# Patient Record
Sex: FEMALE | Race: WHITE
[De-identification: names, ages, dates, MRNs, and addresses within clinical notes are randomized per-mention and may not be internally consistent; named-entity substitution may affect disease eponyms.]

---

## 2018-05-22 ENCOUNTER — HOSPITAL ENCOUNTER (OUTPATIENT)
Dept: HOSPITAL 47 - RADMAMWWP | Age: 68
Discharge: HOME | End: 2018-05-22
Payer: COMMERCIAL

## 2018-05-22 DIAGNOSIS — Z12.31: Primary | ICD-10-CM

## 2018-05-22 PROCEDURE — 77067 SCR MAMMO BI INCL CAD: CPT

## 2018-05-23 NOTE — MM
Reason for exam: screening  (asymptomatic).

Last mammogram was performed 1 year and 1 month ago.



History:

Patient is postmenopausal.

Took hormonal contraceptives for 5 years.



Physical Findings:

A clinical breast exam by your physician is recommended on an annual basis and 

results should be correlated with mammographic findings.



MG Screening Mammo w CAD

Bilateral CC and MLO view(s) were taken.

Prior study comparison: May 1, 2017, bilateral MG screening mammo w CAD.  May 4, 

2016, bilateral MG screening mammo w CAD.

There are scattered fibroglandular densities.  There is no discrete abnormality.  

No significant changes when compared with prior studies.





ASSESSMENT: Negative, BI-RAD 1



RECOMMENDATION:

Routine screening mammogram of both breasts in 1 year.

## 2018-08-13 ENCOUNTER — HOSPITAL ENCOUNTER (OUTPATIENT)
Dept: HOSPITAL 47 - RADECHMAIN | Age: 68
Discharge: HOME | End: 2018-08-13
Attending: FAMILY MEDICINE
Payer: COMMERCIAL

## 2018-08-13 DIAGNOSIS — R55: Primary | ICD-10-CM

## 2018-08-13 PROCEDURE — 93271 ECG/MONITORING AND ANALYSIS: CPT

## 2018-08-13 PROCEDURE — 93270 REMOTE 30 DAY ECG REV/REPORT: CPT

## 2019-06-19 ENCOUNTER — HOSPITAL ENCOUNTER (OUTPATIENT)
Dept: HOSPITAL 47 - RADMAMWWP | Age: 69
Discharge: HOME | End: 2019-06-19
Attending: FAMILY MEDICINE
Payer: COMMERCIAL

## 2019-06-19 DIAGNOSIS — Z12.31: Primary | ICD-10-CM

## 2019-06-19 PROCEDURE — 77067 SCR MAMMO BI INCL CAD: CPT

## 2019-06-24 NOTE — MM
Reason for exam: screening  (asymptomatic).

Last mammogram was performed 1 year and 1 month ago.



History:

Patient is postmenopausal.

Took hormonal contraceptives for 5 years.



Physical Findings:

A clinical breast exam by your physician is recommended on an annual basis and 

results should be correlated with mammographic findings.



MG Screening Mammo w CAD

Bilateral CC and MLO view(s) were taken.

Prior study comparison: May 22, 2018, bilateral MG screening mammo w CAD.  May 1, 

2017, bilateral MG screening mammo w CAD.

There are scattered fibroglandular densities.  Benign vascular calcifications.  No

significant changes when compared with prior studies.





ASSESSMENT: Negative, BI-RAD 1



RECOMMENDATION:

Routine screening mammogram of both breasts in 1 year.

## 2020-06-22 ENCOUNTER — HOSPITAL ENCOUNTER (OUTPATIENT)
Dept: HOSPITAL 47 - RADMAMWWP | Age: 70
Discharge: HOME | End: 2020-06-22
Attending: FAMILY MEDICINE
Payer: COMMERCIAL

## 2020-06-22 DIAGNOSIS — Z12.31: Primary | ICD-10-CM

## 2020-06-22 PROCEDURE — 77067 SCR MAMMO BI INCL CAD: CPT

## 2020-06-23 NOTE — MM
Reason for exam: screening  (asymptomatic).

Last mammogram was performed 1 year ago.



History:

Patient is postmenopausal.

Took hormonal contraceptives for 5 years.



Physical Findings:

A clinical breast exam by your physician is recommended on an annual basis and 

results should be correlated with mammographic findings.



MG Screening Mammo w CAD

Bilateral CC and MLO view(s) were taken.

Prior study comparison: June 19, 2019, bilateral MG screening mammo w CAD.  May 

22, 2018, bilateral MG screening mammo w CAD.

There are scattered fibroglandular densities.  There is no discrete abnormality.  

No significant changes when compared with prior studies.





ASSESSMENT: Negative, BI-RAD 1



RECOMMENDATION:

Routine screening mammogram of both breasts in 1 year.

## 2021-06-24 ENCOUNTER — HOSPITAL ENCOUNTER (OUTPATIENT)
Dept: HOSPITAL 47 - RADMAMWWP | Age: 71
Discharge: HOME | End: 2021-06-24
Attending: FAMILY MEDICINE
Payer: COMMERCIAL

## 2021-06-24 DIAGNOSIS — Z78.0: ICD-10-CM

## 2021-06-24 DIAGNOSIS — Z12.31: Primary | ICD-10-CM

## 2021-06-24 PROCEDURE — 77067 SCR MAMMO BI INCL CAD: CPT

## 2021-06-28 NOTE — MM
Reason for exam: screening  (asymptomatic).

Last mammogram was performed 1 year ago.



History:

Patient is postmenopausal.

Took hormonal contraceptives for 5 years.



Physical Findings:

A clinical breast exam by your physician is recommended on an annual basis and 

results should be correlated with mammographic findings.



MG Screening Mammo w CAD

Bilateral CC and MLO view(s) were taken.

Prior study comparison: June 22, 2020, bilateral MG screening mammo w CAD.  June 19, 2019, bilateral MG screening mammo w CAD.

There are scattered fibroglandular densities.  No significant changes when 

compared with prior studies.





ASSESSMENT: Benign, BI-RAD 2



RECOMMENDATION:

Routine screening mammogram of both breasts in 1 year.

## 2022-04-12 ENCOUNTER — HOSPITAL ENCOUNTER (OUTPATIENT)
Dept: HOSPITAL 47 - LABWHC1 | Age: 72
Discharge: HOME | End: 2022-04-12
Payer: COMMERCIAL

## 2022-04-12 DIAGNOSIS — J30.89: Primary | ICD-10-CM

## 2022-04-12 PROCEDURE — 36415 COLL VENOUS BLD VENIPUNCTURE: CPT

## 2022-04-12 PROCEDURE — 86001 ALLERGEN SPECIFIC IGG: CPT

## 2022-07-21 ENCOUNTER — HOSPITAL ENCOUNTER (OUTPATIENT)
Dept: HOSPITAL 47 - RADMAMWWP | Age: 72
Discharge: HOME | End: 2022-07-21
Attending: FAMILY MEDICINE
Payer: COMMERCIAL

## 2022-07-21 DIAGNOSIS — Z12.31: Primary | ICD-10-CM

## 2022-07-21 DIAGNOSIS — Z78.0: ICD-10-CM

## 2022-07-21 PROCEDURE — 77067 SCR MAMMO BI INCL CAD: CPT

## 2022-07-22 NOTE — MM
Reason for Exam: Screening  (asymptomatic). 

Last mammogram was performed 1 year(s) and 1 month(s) ago. 





Patient History: 

Menarche at age 12. First Full-Term Pregnancy at age 18. Postmenopausal. Patient used Hormonal

Contraceptives for 5 years. 





Risk Values: 

[place] 5 year model risk: 1.3%.

NCI Lifetime model risk: 3.3%.





Prior Study Comparison: 

6/19/2019 Bilateral Screening Mammogram, [facility]. 6/22/2020 Bilateral Screening Mammogram, [facility].

6/24/2021 Bilateral Screening Mammogram, [facility]. 





Tissue Density: 

There are scattered fibroglandular densities.





Findings: 

Analyzed By CAD. 

There is no suspicious group of microcalcifications or new suspicious mass in either breast. No

significant change from prior examination. 





Overall Assessment: Negative, BI-RAD 1





Management: 

Screening Mammogram of both breasts in 1 year.

A clinical breast exam by your physician is recommended on an annual basis and results should be

correlated with mammographic findings.



Electronically signed and approved by: Fred Jalloh D.O.

## 2022-10-18 ENCOUNTER — HOSPITAL ENCOUNTER (OUTPATIENT)
Dept: HOSPITAL 47 - RADCTMAIN | Age: 72
Discharge: HOME | End: 2022-10-18
Attending: ORTHOPAEDIC SURGERY
Payer: COMMERCIAL

## 2022-10-18 DIAGNOSIS — M17.12: Primary | ICD-10-CM

## 2022-10-18 DIAGNOSIS — M21.162: ICD-10-CM

## 2022-10-18 NOTE — CT
CT left knee [facility] protocol

 

HISTORY: Osteoarthritis, pain

 

Helical acquisition through the left lower extremity for surgical planning purposes urine automated e
xposure control for dose reduction,  mGycentimeters. Sagittal and coronal reconstructions.

 

No comparisons

 

Left hip joint shows some mild hypertrophic change of the acetabulum. Marked osteoarthritic changes p
resent within the knee, tricompartmental marginal spurring, loss of joint space, there is likely ther
e is deformity, loose body. Ankle joint is intact. There is a plantar calcaneal spur present. Some sp
urring present at the tibiotalar joint.

 

Diverticular change noted incidentally within the colon.

 

IMPRESSION: CT for procedure planning purposes.

## 2022-11-07 ENCOUNTER — HOSPITAL ENCOUNTER (OUTPATIENT)
Dept: HOSPITAL 47 - LABPAT | Age: 72
Discharge: HOME | End: 2022-11-07
Attending: ORTHOPAEDIC SURGERY
Payer: COMMERCIAL

## 2022-11-07 DIAGNOSIS — Z01.812: Primary | ICD-10-CM

## 2022-11-07 LAB
APTT BLD: 30 SEC (ref 22–30)
ERYTHROCYTE [DISTWIDTH] IN BLOOD BY AUTOMATED COUNT: 4.15 X 10*6/UL (ref 4.1–5.2)
ERYTHROCYTE [DISTWIDTH] IN BLOOD: 12.9 % (ref 11.5–14.5)
HCT VFR BLD AUTO: 40.5 % (ref 37.2–46.3)
HGB BLD-MCNC: 12.6 G/DL (ref 12–15)
INR PPP: 1.2 (ref ?–1.2)
MCH RBC QN AUTO: 30.4 PG (ref 27–32)
MCHC RBC AUTO-ENTMCNC: 31.1 G/DL (ref 32–37)
MCV RBC AUTO: 97.6 FL (ref 80–97)
NRBC BLD AUTO-RTO: 0 /100 WBCS (ref 0–0)
PLATELET # BLD AUTO: 169 X 10*3/UL (ref 140–440)
PT BLD: 12.3 SEC (ref 9–12)
WBC # BLD AUTO: 7.97 X 10*3/UL (ref 4.5–10)

## 2022-11-07 PROCEDURE — 81003 URINALYSIS AUTO W/O SCOPE: CPT

## 2022-11-07 PROCEDURE — 85610 PROTHROMBIN TIME: CPT

## 2022-11-07 PROCEDURE — 85730 THROMBOPLASTIN TIME PARTIAL: CPT

## 2022-11-07 PROCEDURE — 87070 CULTURE OTHR SPECIMN AEROBIC: CPT

## 2022-11-07 PROCEDURE — 80053 COMPREHEN METABOLIC PANEL: CPT

## 2022-11-07 PROCEDURE — 36415 COLL VENOUS BLD VENIPUNCTURE: CPT

## 2022-11-07 PROCEDURE — 85027 COMPLETE CBC AUTOMATED: CPT

## 2022-11-08 LAB
ALBUMIN SERPL-MCNC: 4.1 G/DL (ref 3.8–4.9)
ALBUMIN/GLOB SERPL: 1.2 G/DL (ref 1.6–3.17)
ALP SERPL-CCNC: 69 U/L (ref 41–126)
ALT SERPL-CCNC: 8 U/L (ref 8–44)
ANION GAP SERPL CALC-SCNC: 12 MMOL/L (ref 10–18)
AST SERPL-CCNC: 27 U/L (ref 13–35)
BUN SERPL-SCNC: 24.4 MG/DL (ref 9–27)
BUN/CREAT SERPL: 26.7 RATIO (ref 12–20)
CALCIUM SPEC-MCNC: 9.5 MG/DL (ref 8.7–10.3)
CHLORIDE SERPL-SCNC: 99 MMOL/L (ref 96–109)
CO2 SERPL-SCNC: 25.2 MMOL/L (ref 20–27.5)
GLOBULIN SER CALC-MCNC: 3.4 G/DL (ref 1.6–3.3)
GLUCOSE SERPL-MCNC: 90 MG/DL (ref 70–110)
PH UR: 5.5 [PH] (ref 5–8)
POTASSIUM SERPL-SCNC: 4.3 MMOL/L (ref 3.5–5.5)
PROT SERPL-MCNC: 7.4 G/DL (ref 6.2–8.2)
SODIUM SERPL-SCNC: 136 MMOL/L (ref 135–145)
SP GR UR: 1.02 (ref 1–1.03)
UROBILINOGEN UR QL: 0.2

## 2022-12-02 ENCOUNTER — HOSPITAL ENCOUNTER (OUTPATIENT)
Dept: HOSPITAL 47 - OR | Age: 72
LOS: 2 days | Discharge: HOME HEALTH | End: 2022-12-04
Attending: ORTHOPAEDIC SURGERY
Payer: COMMERCIAL

## 2022-12-02 DIAGNOSIS — I10: ICD-10-CM

## 2022-12-02 DIAGNOSIS — Z96.652: ICD-10-CM

## 2022-12-02 DIAGNOSIS — Z71.82: ICD-10-CM

## 2022-12-02 DIAGNOSIS — I48.91: ICD-10-CM

## 2022-12-02 DIAGNOSIS — M17.12: Primary | ICD-10-CM

## 2022-12-02 DIAGNOSIS — Z79.01: ICD-10-CM

## 2022-12-02 PROCEDURE — 97161 PT EVAL LOW COMPLEX 20 MIN: CPT

## 2022-12-02 PROCEDURE — 73560 X-RAY EXAM OF KNEE 1 OR 2: CPT

## 2022-12-02 PROCEDURE — 94760 N-INVAS EAR/PLS OXIMETRY 1: CPT

## 2022-12-02 PROCEDURE — 85025 COMPLETE CBC W/AUTO DIFF WBC: CPT

## 2022-12-02 PROCEDURE — 64447 NJX AA&/STRD FEMORAL NRV IMG: CPT

## 2022-12-02 PROCEDURE — 76942 ECHO GUIDE FOR BIOPSY: CPT

## 2022-12-02 PROCEDURE — 64999 UNLISTED PX NERVOUS SYSTEM: CPT

## 2022-12-02 PROCEDURE — 27447 TOTAL KNEE ARTHROPLASTY: CPT

## 2022-12-02 RX ADMIN — POTASSIUM CHLORIDE SCH MLS/HR: 14.9 INJECTION, SOLUTION INTRAVENOUS at 21:52

## 2022-12-02 RX ADMIN — DOCUSATE SODIUM AND SENNOSIDES SCH EACH: 50; 8.6 TABLET ORAL at 21:54

## 2022-12-02 RX ADMIN — POTASSIUM CHLORIDE SCH MLS: 14.9 INJECTION, SOLUTION INTRAVENOUS at 14:20

## 2022-12-02 RX ADMIN — HYDROCODONE BITARTRATE AND ACETAMINOPHEN PRN EACH: 5; 325 TABLET ORAL at 21:53

## 2022-12-02 NOTE — XR
EXAMINATION TYPE: XR knee limited LT

 

DATE OF EXAM: 12/2/2022

 

COMPARISON: NONE

 

HISTORY: Postop knee pain

 

TECHNIQUE: 2 view

 

FINDINGS: There is left knee prosthesis. Components are in anatomic position.

 

IMPRESSION: No complicating process seen

## 2022-12-02 NOTE — P.OP
Date of Procedure: 12/02/22


Preoperative Diagnosis: 


1.  Severe left knee osteoarthritis


2.  BMI 37


3.  Atrial fibrillation on Xarelto


Postoperative Diagnosis: 


Same


Procedure(s) Performed: 


Left total knee replacement


Implants: 


1. Niles Triathlon CR Femur Size #5


2. Shilpi Triathlon Universal Tibial Base Size #4 with [id number] stem


3. Shilpi Triathlon CS poly Size #4, 10-mm


4. Shilpi Triathlon all poly patella, Size #29


Anesthesia: SNEHA, regional


Surgeon: Cole Marc


Assistant #1: Chapincito Anderson


Estimated Blood Loss (ml): 50


IV fluids (ml): 1,200


Pathology: none sent (Gross and/or histopathologic evaluation by pathology is 

not indicated based on my interpretation of preoperative imaging and 

intraoperative findings.)


Condition: stable


Disposition: PACU


Indications for Procedure: 


I met with the patient preoperatively in the office setting and discussed 

treatment of their symptomatic knee arthritis.  They failed a long course of 

nonsurgical treatment and elected to proceed with an elective total knee 

replacement.  I discussed the potential risks and complications at length and 

gave them ample time to ask questions.  Risks discussed included: risks from 

anesthesia, superficial site surgical infection, acute and/or chronic 

periprosthetic joint infection, delayed wound healing, drainage, wound necrosis,

instability, stiffness, stiffness requiring manipulation and/or revision 

surgery, damage to local blood vessels or nerves, aseptic loosening of the 

implants, extensor mechanism issues including disruption, patellar maltracking, 

avascular necrosis etc., continued or worsened knee pain, generalized dissat

isfaction with surgical outcome, need for revision surgery, an inability to 

regain preinjury level of function, DVT, PE, other medical complications, and 

possibly loss of life or limb.  The patient voiced their understanding that 

while these are the most common complications other less common complications 

are possible.  They provided both their verbal and written consent to go forward

with surgery.


Operative Findings: 


Severe tricompartmental osteoarthritis


Description of Procedure: 


The patient was identified in preoperative holding and the correct operative 

extremity was verified and marked with a marker.  I reviewed the consent form 

with the patient at length.  All of their questions were answered.  The patient 

was given a block by anesthesia.  They were then brought back to the operating 

room.  They were transferred onto the operating room table where a general 

anesthetic, preoperative antibiotics, and tranexamic acid were administered by 

anesthesia.  A tourniquet was applied to the proximal aspect of the operative 

extremity.  The contralateral extremity was padded under the heel and secured to

the operating room table with a nonsterile blue towel and tape.  The ipsilateral

arm was carefully draped across the patient's chest and secured with a pillow 

and foam.  A post was applied over the lateral aspect of the ipsilateral thigh 

and a bolster was placed under the ipsilateral foot.  I verified that the 

operative extremity was stable and the knee was flexed to 90.  The operative 

extremity was then placed in a leg santos, nonsterile drapes were applied, and 

the extremity was prepped and draped sterilely in the standard sterile fashion. 

Prior to starting surgery timeout was performed identifying the correct patient,

operative extremity, and procedure.  The leg was then elevated, exsanguinated 

with an Esmarch bandage, and the tourniquet was inflated.





An anterior midline incision was made sharply with a scalpel.  Once I had 

dissected deep to the superficial fascial layer medial and lateral flaps were 

elevated.  A medial parapatellar arthrotomy was created.  Upon opening the knee 

joint there were diffuse arthritic changes in all 3 compartments.  The anterior 

horn of the medial meniscus were sharply released and a medial release was 

performed around the posterior medial corner of the knee to facilitate retractor

placement.  The fat pad was excised with electrocautery.  The patella was found 

to be severely arthritic and a provisional cut was made with a sagittal saw to 

facilitate mobilization of the extensor mechanism during the procedure.  

Remnants of the ACL and PCL were then excised from the notch.  4 mm pins were 

then placed within the incision in the medial distal femur and proximal tibia.  

Arrays were applied to the pins and I verified they were completely tightened.  

The knee was then registered with the "[employer] robot and manipulations in implant 

position were made to balance the knee and opitmize implant position.  Using the

"[employer] robotic saw all cuts were made in accordance with our plan.  After all bony

fragments had been removed the cuts were verified with the planar probe.  The 

tibia was then subluxed forward and sized.  The knee was brought into flexion 

and a lamina  was placed to allow removal of the meniscal remnants both 

medially and laterally as well as posterior osteophytes.  Local anesthetic was 

then infiltrated around the joint capsule.  Trial implants were then placed 

within the knee.  Range of motion and collateral ligament tension was then 

evaluated.  Adjustments in implant size and position were then made accordingly.

 Once the knee was felt to be appropriately balanced the Adam pins were removed.

The patella was then recut, sized, and punched.  A trial patellar button was 

then placed.  With the trial components in place, the patella tracked midline.  

The femur was then drilled and the trial component removed.  The trial tibial 

component was then appropriately rotated, pinned, and prepared for the keel.  

All trial components were then removed from the knee.  The knee was thoroughly 

irrigated with pulsatile lavage.  Cement was prepared via vacuum mixing in a 

bowl on the back table.  I then hand pressurized cement into the femur and tibia

and placed the implants beginning with the tibial base tray and poly liner, 

femoral component, and finally the patellar button.  All extruded cement was 

removed including from the pin sites.  Once the cement had hardened the knee was

evaluated one final time with the final polyethylene liner in place.  The knee 

had full extension and flexion and felt stable to varus and valgus stress 

throughout the arc of motion.  The tourniquet was released and with the 

tourniquet down the patella tracked midline.  All bleeders were controlled with 

electrocautery.  The knee was then soaked for 3 minutes with a dilute Betadine 

soak.  The knee was thoroughly irrigated using 3 L of sterile saline and 

pulsatile lavage.  A deep drain was placed.  The extensor mechanism was then 

reapproximated using pop off Vicryl sutures followed by a running barbed suture.

 The knee was then closed in layers with a 0 strata fix for the deep fascial 

layer, 2-0 strata fix for the superficial subcutaneous layer and Monocryl and 

Steri-Strips for the skin.  A sterile dressing and drain sponge were applied.  I

verified that all instrument, sponge, and sharp counts were correct.  The 

patient was then transferred off the operating room table, extubated, and 

brought to recovery having tolerated the procedure well.





Chapincito Anderson PA-C was required as a skilled assistant due to the complexity of 

the procedure for patient positioning, draping, retraction, placement of 

hardware, and closure of wound.





PLAN: The patient can weight-bear as tolerated on the operative extremity.  DVT 

prophylaxis with aspirin 81 mg twice a day based on preoperative risk 

stratification.  Follow-up in the office in 2 weeks for wound check and x-rays 

of the knee including an AP and lateral.

## 2022-12-02 NOTE — P.ANPRN
Procedure Note - Anesthesia





- Nerve Block Performed


  ** Left Adductor Canal Single


Time Out Performed: Yes


Date of Procedure: 12/02/22


Procedure Start Time: 14:48


Procedure Stop Time: 14:56


Location of Patient: PreOp


Indication: Acute Post-Operative Pain, Requested by Surgeon


Sedation Type: Sedate with meaningful contact maintained


Preparation: Sterile Prep, Sterile Dressing


Position: Supine


Catheter: None


Needle Types: Facet


Needle Gauge: 20


Ultrasound used to visualize needle placement: Yes


Ultrasound used to observe medication spread: Yes


Injectate: 0.5% Ropivacaine (see comment for volume) (15 ml + 2 mg decadron)


Blood Aspirated: No


Pain Paresthesia on Injection Noted: No


Resistance on Injection: Normal


Image Stored and Saved: Yes


Events: Uneventful and Well Tolerated





  ** Left iPack Single


Time Out Performed: Yes


Date of Procedure: 12/02/22


Procedure Start Time: 14:57


Procedure Stop Time: 15:04


Location of Patient: PreOp


Indication: Acute Post-Operative Pain, Requested by Surgeon


Sedation Type: Sedate with meaningful contact maintained


Preparation: Sterile Prep, Sterile Dressing


Position: Right Lateral


Catheter: None


Needle Types: Facet


Needle Gauge: 20


Ultrasound used to visualize needle placement: Yes


Ultrasound used to observe medication spread: Yes


Injectate: 0.5% Ropivacaine (see comment for volume) (15 ml + decadron 2 mg)


Blood Aspirated: No


Pain Paresthesia on Injection Noted: No


Resistance on Injection: Normal


Image Stored and Saved: Yes


Events: Uneventful and Well Tolerated

## 2022-12-03 LAB
BASOPHILS # BLD AUTO: 0.01 X 10*3/UL (ref 0–0.1)
BASOPHILS NFR BLD AUTO: 0.1 %
EOSINOPHIL # BLD AUTO: 0 X 10*3/UL (ref 0.04–0.35)
EOSINOPHIL NFR BLD AUTO: 0 %
ERYTHROCYTE [DISTWIDTH] IN BLOOD BY AUTOMATED COUNT: 3.78 X 10*6/UL (ref 4.1–5.2)
ERYTHROCYTE [DISTWIDTH] IN BLOOD: 13.2 % (ref 11.5–14.5)
GLUCOSE BLD-MCNC: 126 MG/DL (ref 70–110)
HCT VFR BLD AUTO: 36.7 % (ref 37.2–46.3)
HGB BLD-MCNC: 11.7 G/DL (ref 12–15)
IMM GRANULOCYTES BLD QL AUTO: 0.5 %
LYMPHOCYTES # SPEC AUTO: 1.22 X 10*3/UL (ref 0.9–5)
LYMPHOCYTES NFR SPEC AUTO: 11.1 %
MCH RBC QN AUTO: 31 PG (ref 27–32)
MCHC RBC AUTO-ENTMCNC: 31.9 G/DL (ref 32–37)
MCV RBC AUTO: 97.1 FL (ref 80–97)
MONOCYTES # BLD AUTO: 0.73 X 10*3/UL (ref 0.2–1)
MONOCYTES NFR BLD AUTO: 6.6 %
NEUTROPHILS # BLD AUTO: 8.99 X 10*3/UL (ref 1.8–7.7)
NEUTROPHILS NFR BLD AUTO: 81.7 %
NRBC BLD AUTO-RTO: 0 /100 WBCS (ref 0–0)
PLATELET # BLD AUTO: 151 X 10*3/UL (ref 140–440)
WBC # BLD AUTO: 11 X 10*3/UL (ref 4.5–10)

## 2022-12-03 RX ADMIN — IPRATROPIUM BROMIDE SCH: 42 SPRAY NASAL at 20:57

## 2022-12-03 RX ADMIN — METOPROLOL SUCCINATE SCH MG: 25 TABLET, EXTENDED RELEASE ORAL at 08:38

## 2022-12-03 RX ADMIN — POTASSIUM CHLORIDE SCH: 14.9 INJECTION, SOLUTION INTRAVENOUS at 16:04

## 2022-12-03 RX ADMIN — OXYCODONE HYDROCHLORIDE AND ACETAMINOPHEN SCH MG: 500 TABLET ORAL at 08:39

## 2022-12-03 RX ADMIN — HYDROCODONE BITARTRATE AND ACETAMINOPHEN PRN EACH: 5; 325 TABLET ORAL at 22:05

## 2022-12-03 RX ADMIN — IPRATROPIUM BROMIDE SCH: 42 SPRAY NASAL at 08:40

## 2022-12-03 RX ADMIN — DOCUSATE SODIUM AND SENNOSIDES SCH EACH: 50; 8.6 TABLET ORAL at 20:55

## 2022-12-03 RX ADMIN — SERTRALINE HYDROCHLORIDE SCH MG: 50 TABLET, FILM COATED ORAL at 08:38

## 2022-12-03 RX ADMIN — POTASSIUM CHLORIDE SCH: 14.9 INJECTION, SOLUTION INTRAVENOUS at 05:31

## 2022-12-03 RX ADMIN — Medication SCH MCG: at 08:39

## 2022-12-03 RX ADMIN — HYDROCODONE BITARTRATE AND ACETAMINOPHEN PRN EACH: 5; 325 TABLET ORAL at 16:01

## 2022-12-03 RX ADMIN — POTASSIUM CHLORIDE SCH MLS/HR: 14.9 INJECTION, SOLUTION INTRAVENOUS at 05:30

## 2022-12-03 RX ADMIN — HYDROCODONE BITARTRATE AND ACETAMINOPHEN PRN EACH: 5; 325 TABLET ORAL at 05:30

## 2022-12-03 NOTE — CONS
CONSULTATION



REASON FOR CONSULTATION:

Advice regarding atrial fibrillation and other medical issues, requested by [facility]

[facility].



HISTORY OF PRESENT ILLNESS:

This 72-year-old woman with a past medical history of atrial fibrillation,

hypertension, and other medical issues, underwent left total knee arthroplasty.  There

is no history of any fever or rigors.  No history of any headache, loss of

consciousness, chest pain, or palpitations at this time.



PAST MEDICAL HISTORY:

Atrial fibrillation, hypertension.  The rest of the history and rest of the chart is

reviewed.



HOME MEDICATIONS:

Reviewed and include Zestril.  Dose and rest of medications reviewed.



ALLERGIES:

None.



FAMILY HISTORY:

No history of heart disease or strokes in the family.



SOCIAL HISTORY:

No history of smoking or alcohol.



REVIEW OF SYSTEMS:

A 14-point review of systems is negative as mentioned earlier.



PHYSICAL EXAMINATION:

VITAL SIGNS:  Pulse is 61, blood pressure 110/61, respirations 17.

HEENT:  Conjunctivae normal.

NECK:  No JVD.

CARDIOVASCULAR:  S1, S2.

RESPIRATION:  Breath sounds diminished at the bases.

ABDOMEN:  Soft.

LEGS:  Status post left knee arthroplasty.

SKIN:  No ulcer, rash, or bleeding.

JOINTS:  No active deforming arthropathy.



LABORATORY DATA:

WBC 11.  The rest of the labs are noted.



ASSESSMENT:

1. Status post left total knee arthroplasty.

2. History of atrial fibrillation.

3. Hypertension.

4. Degenerative joint disease.

5. Multiple medical issues.



RECOMMENDATIONS:

I recommend to continue current medications, continue symptomatic treatment.

Otherwise, resume the home medications including Xarelto when okay with Orthopedic

[facility].  Otherwise, DVT prophylaxis.  Further recommendations to follow.





MMODL / IJN: [id number] / Job#: [id number]

## 2022-12-03 NOTE — P.DS
Providers


Date of admission: 





Friday, 12/02/2022


Attending physician: 


Cole Marc





Consults: 





                                        





12/02/22 19:27


Consult Physician Routine 


   Consulting Provider: Simran Grande


   Consult Reason/Comments: medical management


   Do you want consulting provider notified?: Yes











Primary care physician: 


Eleno Mau





[facility] Course: 





The patient is very pleasant previously healthy 72-year-old that has a 

long-standing history of problems with her left knee.  She underwent an 

uncomplicated left total knee replacement.  She was admitted under my care.  

Medical issues were managed by internal medicine.  Her drain was pulled on 

postoperative day #1.  She worked with physical therapy.  She ultimately did 

well and was cleared for discharge home.





Plan - Discharge Summary


Discharge Rx Participant: Yes


New Discharge Prescriptions: 


New


   Docusate [Colace] 100 mg PO BID #60 capsule


   HYDROcodone/APAP 5-325MG [Norco 5-325] 1 - 2 tab PO Q6HR PRN 7 Days #32 tab


     PRN Reason: Pain


   Omeprazole 40 mg PO DAILY 30 Days #30 cap





No Action


   lisinopriL [Zestril] 20 mg PO DAILY


   Ipratropium Bromide 0.06%Nasal [Atrovent Nasal 0.06%] 2 spray EA NOSTRIL BID


   Rivaroxaban [Xarelto] 20 mg PO HS


   hydroCHLOROthiazide [Hydrodiuril] 12.5 mg PO DAILY


   Sertraline [Zoloft] 50 mg PO DAILY


   Metoprolol Succinate (ER) [Toprol Xl] 25 mg PO DAILY


   Cholecalciferol [Vitamin D3 (25 Mcg = 1000 Iu)] 25 mcg PO DAILY


   Ascorbic Acid [Vitamin C] 500 mg PO DAILY


Discharge Medication List





lisinopriL [Zestril] 20 mg PO DAILY 11/29/22 [History]


Ascorbic Acid [Vitamin C] 500 mg PO DAILY 11/30/22 [History]


Cholecalciferol [Vitamin D3 (25 Mcg = 1000 Iu)] 25 mcg PO DAILY 11/30/22 

[History]


Ipratropium Bromide 0.06%Nasal [Atrovent Nasal 0.06%] 2 spray EA NOSTRIL BID 

11/30/22 [History]


Metoprolol Succinate (ER) [Toprol Xl] 25 mg PO DAILY 11/30/22 [History]


Rivaroxaban [Xarelto] 20 mg PO HS 11/30/22 [History]


Sertraline [Zoloft] 50 mg PO DAILY 11/30/22 [History]


hydroCHLOROthiazide [Hydrodiuril] 12.5 mg PO DAILY 11/30/22 [History]


Docusate [Colace] 100 mg PO BID #60 capsule 12/02/22 [Rx]


HYDROcodone/APAP 5-325MG [Norco 5-325] 1 - 2 tab PO Q6HR PRN 7 Days #32 tab 

12/02/22 [Rx]


Omeprazole 40 mg PO DAILY 30 Days #30 cap 12/02/22 [Rx]








Follow up Appointment(s)/Referral(s): 


[facility], [NON-STAFF] - 1-2 Days


Eleno León DO [Primary Care Provider] - 1 Week (Office is closed please 

call for appointment)


Cole Marc MD [Medical Doctor] - 2 Weeks (Office is closed please call 

for an appointment)


Activity/Diet/Wound Care/Special Instructions: 


Weight bear to tolerance on operative extremity with a walker. 


Keep operative dressing intact until follow-up appointment in the office. Call 

the office if dressing becomes saturated or falls off. May shower over dressing.




Take pain medications as prescribed. Resume Xarelto for blood clot prevention. 

Take antibiotics as prescribed.


Follow-up in the office in two weeks at [facility]. 


Call the office with any questions or concerns, [phone]


Discharge Disposition: HOME WITH HOME HEALTH SERVICES

## 2022-12-03 NOTE — P.PN
Subjective


Progress Note Date: 12/03/22





Overall the patient is doing well.  She has significant improvement in her pain 

from preop, but we discussed this may be due to her block.  She is artery been 

up with physical therapy.  She denies chest pain or shortness of breath.





Objective





- Vital Signs


Vital signs: 


                                   Vital Signs











Temp  97.4 F L  12/03/22 08:04


 


Pulse  61   12/03/22 08:31


 


Resp  17   12/03/22 08:31


 


BP  110/61   12/03/22 08:04


 


Pulse Ox  98   12/03/22 08:04


 


FiO2      








                                 Intake & Output











 12/02/22 12/03/22 12/03/22





 18:59 06:59 18:59


 


Intake Total 1150 200 


 


Output Total 50  90


 


Balance 1100 200 -90


 


Weight 99.4 kg 99.4 kg 


 


Intake:   


 


  IV 1150 200 


 


Output:   


 


  Drainage   90


 


    Left Knee   90


 


  Estimated Blood Loss 50  


 


Other:   


 


  Voiding Method  Toilet Toilet


 


  # Voids  2 














- Exam





The patient is sitting up in a chair at bedside.  She is alert and able to 

answer questions.  A focused exam of the left lower extremity was conducted.  On

inspection she has a clean appearing dressing with no strikethrough.  Her drain 

is in place and was removed.  A new sponge was applied.  Her thigh and calf are 

soft.  Motor and sensory function are intact.





- Labs


Labs: 


                  Abnormal Lab Results - Last 24 Hours (Table)











  12/03/22 Range/Units





  06:09 


 


POC Glucose (mg/dL)  126 H  ()  mg/dL














Assessment and Plan


Assessment: 





Postoperative day #1 status post left total knee replacement, doing well


Plan: 





1.  Weightbearing as tolerated left lower extremity, up with assistance, 

mobilize out of bed to chair


2.  Drain pulled this morning, leave surgical dressing in place


3.  Physical therapy for mobilization and gait training


4.  Resume Xarelto today for both DVT prophylaxis and treatment for atrial 

fibrillation


5.  Appreciate internal medicine assistance with perioperative medical 

management


6.  Disposition: We will see how the patient does today with physical therapy 

and her pain control.  We'll plan on keeping her overnight and discharging her 

home tomorrow morning.  If she does well and would like to discharge home today 

she is 0K to leave

## 2022-12-04 VITALS
DIASTOLIC BLOOD PRESSURE: 59 MMHG | TEMPERATURE: 97.8 F | HEART RATE: 60 BPM | SYSTOLIC BLOOD PRESSURE: 93 MMHG | RESPIRATION RATE: 17 BRPM

## 2022-12-04 RX ADMIN — SERTRALINE HYDROCHLORIDE SCH MG: 50 TABLET, FILM COATED ORAL at 07:46

## 2022-12-04 RX ADMIN — IPRATROPIUM BROMIDE SCH: 42 SPRAY NASAL at 07:47

## 2022-12-04 RX ADMIN — OXYCODONE HYDROCHLORIDE AND ACETAMINOPHEN SCH MG: 500 TABLET ORAL at 07:47

## 2022-12-04 RX ADMIN — Medication SCH MCG: at 07:47

## 2022-12-04 RX ADMIN — METOPROLOL SUCCINATE SCH MG: 25 TABLET, EXTENDED RELEASE ORAL at 07:47

## 2022-12-04 RX ADMIN — HYDROCODONE BITARTRATE AND ACETAMINOPHEN PRN EACH: 5; 325 TABLET ORAL at 03:12

## 2022-12-04 RX ADMIN — HYDROCODONE BITARTRATE AND ACETAMINOPHEN PRN EACH: 5; 325 TABLET ORAL at 09:03

## 2022-12-04 RX ADMIN — POTASSIUM CHLORIDE SCH MLS/HR: 14.9 INJECTION, SOLUTION INTRAVENOUS at 04:52

## 2022-12-04 RX ADMIN — POTASSIUM CHLORIDE SCH: 14.9 INJECTION, SOLUTION INTRAVENOUS at 05:40

## 2022-12-04 NOTE — P.PN
Subjective


Progress Note Date: 12/04/22 12/4/2022





This is a 72 year old female who was admitted under orthopedic services and is 

status post Left knee arthroplasty. Patient is up and working with physical 

therapy and pain is being managed on current regimen. Patient is on dvt 

prophylaxis in the form of xarelto and will continue. Patient reports she is 

being discharged home today.  Patient is afebrile and denies chest pain or 

shortness of breath. Encouraged increased activity as tolerated. Encouraged 

incentive spirometer use at least 10 times per hour while awake. Home 

medications reviewed and resumed. Recommend monitoring blood pressure and hold 

meds if systolic is 90 or less. 





Review of systems:


Constitutional: No reports of fatigue, fever, or chills


Cardiovascular: No reports of chest pain or palpitations


Respiratory: No reports of shortness of breath or cough


GI:  no reports of  nausea,  no vomiting, reports passing gas


: No reports of dysuria or retention


Neurovascular: no reports of generalized weakness 





All medications have been reviewed











PHYSICAL EXAMINATION: 





GENERAL: The patient is alert and oriented x4,  Well developed, well nourished. 

obese


HEENT: Pupils are round and equally reacting to light. EOMI. no scleral icterus.

No conjunctival pallor. Normocephalic, atraumatic. No pharyngeal erythema. No 

thyromegaly.  


CARDIOVASCULAR: S1 and S2  muffled 


PULMONARY: diminished breath sounds bilaterally with no wheezing or rhonchi 

noted. 


ABDOMEN: soft.  non- tender.  non-distended, normoactive bowel sounds. No 

palpable organomegaly. 


MUSCULOSKELETAL: No joint swelling or deformity.


EXTREMITIES: No cyanosis, clubbing, or pedal edema. left knee surgical site is 

dry and intact 


NEUROLOGICAL: Gross neurological examination did not reveal any focal deficits. 


SKIN: No rashes. 





Assessment:





Status post left total knee arthroplasty


HIstory of atrial fibrillation


hypertension


Degenerative joint disease


History of anxiety and depression


GI prophylaxis


DVT prophylaxis


Full code





Plan:





Recommend to continue with current medications and management per orthopedic 

services. Patient is post op left knee arthroplasty. Patient has been up and 

working with physical therapy and is planning for discharge today. Patient with 

incentive spirometer at the bedside and encouraged to continue using at least 10

times per hour while awake. Patient home medications resumed and blood pressures

are in the 90's systolic and recommend to monitor blood pressure closely and 

hold meds if systolic is 90 or less. Follow up with pcp on discharge. We will 

continue to follow with ortho while hospitalized. Thank you for this co

nsultation. .  





The impression and plan of care has been dictated as a scribe by Margaret Groves,

nurse practitioner as directed.





Dr. Harjinder MD


I have performed a history and examination and MDM of this patient, discussed 

the same with the dictator, and will be documented as a scribe. Based on total 

visit time,  I have performed more than 50% of the visit. Any additional 

findings or plans will be noted. 








Objective





- Vital Signs


Vital signs: 


                                   Vital Signs











Temp  97.8 F   12/04/22 08:00


 


Pulse  60   12/04/22 08:00


 


Resp  17   12/04/22 08:00


 


BP  93/59   12/04/22 08:00


 


Pulse Ox  100   12/04/22 08:00


 


FiO2      








                                 Intake & Output











 12/03/22 12/04/22 12/04/22





 18:59 06:59 18:59


 


Intake Total 236  


 


Output Total 90  


 


Balance 146  


 


Intake:   


 


  Oral 236  


 


Output:   


 


  Drainage 90  


 


    Left Knee 90  


 


Other:   


 


  Voiding Method Toilet Toilet Toilet


 


  # Voids 3 3 














- Labs


CBC & Chem 7: 


                                 12/03/22 06:46





Labs: 


                  Abnormal Lab Results - Last 24 Hours (Table)











  12/03/22 Range/Units





  06:46 


 


WBC  11.00 H  (4.50-10.00)  X 10*3/uL


 


RBC  3.78 L  (4.10-5.20)  X 10*6/uL


 


Hgb  11.7 L  (12.0-15.0)  g/dL


 


Hct  36.7 L  (37.2-46.3)  %


 


MCV  97.1 H  (80.0-97.0)  fL


 


MCHC  31.9 L  (32.0-37.0)  g/dL


 


Immature Gran #  0.05 H  (0.00-0.04)  X 10*3/uL


 


Neutrophils #  8.99 H  (1.80-7.70)  X 10*3/uL


 


Eosinophils #  0 L  (0.04-0.35)  X 10*3/uL

## 2022-12-04 NOTE — P.PN
Progress Note - Text


Progress Note Date: 12/04/22


Today is post-operative day #2. She is status-post left total knee arthroplasty 

on 12/2/22. The patient is examined bedside with Dr. Marc. 


Patient's pain is well-controlled. She is ambulating with a walker with minimal 

assistance. She is comfortable discharging home today. 





On examination, patient is sitting up in bed in no apparent distress. She is 

alert and answer questions appropriately. On inspection of the left knee, 

dressing is clean, dry, intact. Motor and sensory function intact. LLE warm and 

well perfused. Calf-nontender. 





Patient is going to discharge home today, pending medical clearance. She will 

follow-up at [facility] in two weeks.

## 2023-08-31 ENCOUNTER — HOSPITAL ENCOUNTER (OUTPATIENT)
Dept: HOSPITAL 47 - RADMAMWWP | Age: 73
Discharge: HOME | End: 2023-08-31
Attending: FAMILY MEDICINE
Payer: MEDICARE

## 2023-08-31 DIAGNOSIS — Z12.31: Primary | ICD-10-CM

## 2023-08-31 DIAGNOSIS — Z78.0: ICD-10-CM

## 2023-08-31 PROCEDURE — 77067 SCR MAMMO BI INCL CAD: CPT

## 2023-09-01 NOTE — MM
Reason for Exam: Screening  (asymptomatic). 

Last mammogram was performed 1 year(s) and 1 month(s) ago. 





Patient History: 

Menarche at age 12. First Full-Term Pregnancy at age 18. Postmenopausal. Patient used Hormonal

Contraceptives for 5 years. 





Risk Values: 

Verona 5 year model risk: 1.3%.

NCI Lifetime model risk: 3.1%.





Prior Study Comparison: 

6/22/2020 Bilateral Screening Mammogram, [facility]. 6/24/2021 Bilateral Screening Mammogram, [facility].

7/21/2022 Bilateral MG screening mammo w CAD, [facility]. 





Tissue Density: 

There are scattered fibroglandular densities.





Findings: 

Analyzed By CAD. 

There is no suspicious group of microcalcifications or new suspicious mass in either breast.

Bilateral vascular calcifications. 





Overall Assessment: Benign, BI-RAD 2





Management: 

Screening Mammogram of both breasts in 1 year.

A clinical breast exam by your physician is recommended on an annual basis and results should be

correlated with mammographic findings.



Note on Verona scores and lifetime risk:

1. A Verona score greater than 3% is considered moderate risk. If this is the case, consider

specialist referral to assess eligibility for a risk reducing agent.

If overall lifetime risk for the development of breast cancer is 20% or higher, the patient may

qualify for future screening with alternating mammogram and breast MRI.



Electronically signed and approved by: Fred Jalloh D.O.

## 2024-08-16 ENCOUNTER — HOSPITAL ENCOUNTER (OUTPATIENT)
Dept: HOSPITAL 47 - ORWHC2ENDO | Age: 74
End: 2024-08-16
Attending: INTERNAL MEDICINE
Payer: MEDICARE

## 2024-08-16 DIAGNOSIS — K57.30: ICD-10-CM

## 2024-08-16 DIAGNOSIS — I48.91: ICD-10-CM

## 2024-08-16 DIAGNOSIS — Z79.899: ICD-10-CM

## 2024-08-16 DIAGNOSIS — Z12.11: Primary | ICD-10-CM

## 2024-08-16 DIAGNOSIS — I10: ICD-10-CM

## 2024-08-16 PROCEDURE — 45378 DIAGNOSTIC COLONOSCOPY: CPT

## 2024-08-23 NOTE — OP
OPERATIVE REPORT



DATE OF SERVICE : 08/16/2024



BRIEF HISTORY:

The patient is a 74-year-old pleasant white female scheduled for an elective

colonoscopy as a part of screening for colorectal neoplasia.



PROCEDURE PERFORMED:

Colonoscopy.



PREOPERATIVE DIAGNOSIS:

Screening for colon cancer.



ANESTHESIA:

IV sedation per Anesthesia.



DESCRIPTION OF PROCEDURE:

After informed consent was obtained from the patient, she was brought into the

endoscopy unit.  IV conscious sedation was administered by Anesthesia on continuous

monitoring.  Initial digital rectal examination was normal.  The Olympus CF-190 video

colonoscope was then inserted into the rectum and gradually advanced into the cecum.

Careful examination was performed as the scope was gradually being withdrawn.  The

ileocecal valve in the appendiceal orifice was visualized and appeared normal.  The

prep was excellent.  Mucosa of the cecum, ascending colon, transverse colon, descending

colon, sigmoid colon, and rectum appeared normal.  There were moderate sigmoid

diverticulosis seen.  In the rectum, retroflexion was performed, no lesions noted.  The

patient tolerated the procedure well.



IMPRESSION:

1. Moderate sigmoid diverticulosis.

2. No evidence of colorectal neoplasia.



RECOMMENDATIONS:

Findings of this examination were discussed with the patient as well as the family, she

was advised to be a high-fiber diet, take fiber supplements on a regular basis, and

recommend repeat colonoscopy in 10 years.





MMODL / IJN: [id number] / Job#: [id number]

## 2024-09-03 ENCOUNTER — HOSPITAL ENCOUNTER (OUTPATIENT)
Dept: HOSPITAL 47 - RADMAMWWP | Age: 74
Discharge: HOME | End: 2024-09-03
Attending: FAMILY MEDICINE
Payer: MEDICARE

## 2024-09-03 PROCEDURE — 77067 SCR MAMMO BI INCL CAD: CPT

## 2024-09-03 PROCEDURE — 77063 BREAST TOMOSYNTHESIS BI: CPT

## 2024-09-08 NOTE — MM
Reason for Exam: Screening  (asymptomatic). 

Last mammogram was performed 1 year(s) and 1 month(s) ago. 





Patient History: 

Menarche at age 12. First Full-Term Pregnancy at age 18. Postmenopausal. Patient used Hormonal

Contraceptives for 5 years. 





Risk Values: 

Verona 5 year model risk: 1.3%.

NCI Lifetime model risk: 3.0%.





Prior Study Comparison: 

6/24/2021 Bilateral Screening Mammogram, [facility]. 7/21/2022 Bilateral MG screening mammo w CAD, [facility].

8/31/2023 Bilateral MG screening mammo w CAD, [facility]. 





Tissue Density: 

The breasts are almost entirely fatty.





Findings: 

Analyzed By CAD. 

Right breast: There is no suspicious group of microcalcifications or new suspicious mass.



Left breast: There is no suspicious group of microcalcifications or new suspicious mass. 





Overall Assessment: Negative, BI-RAD 1





Management: 

Screening Mammogram of both breasts in 1 year.

[facility] will attempt to contact patient to return for supplemental views and

ultrasound if indicated.



Patient should continue monthly self-breast exams.  A clinical breast exam by your physician is

recommended on an annual basis.

This exam should not preclude additional follow-up of suspicious palpable abnormalities.



Note on Verona scores and lifetime risk:

1. A Verona score greater than 3% is considered moderate risk. If this is the case, consider

specialist referral to assess eligibility for a risk reducing agent.

2. If overall lifetime risk for the development of breast cancer is 20% or higher, the patient may

qualify for future screening with alternating mammogram and breast MRI.



Electronically signed and approved by: Santhosh Velazquez DO

## 2024-09-22 ENCOUNTER — HOSPITAL ENCOUNTER (INPATIENT)
Dept: HOSPITAL 47 - EC | Age: 74
LOS: 5 days | Discharge: HOME | DRG: 417 | End: 2024-09-27
Attending: HOSPITALIST | Admitting: HOSPITALIST
Payer: MEDICARE
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
## 2024-09-22 DIAGNOSIS — L98.8: ICD-10-CM

## 2024-09-22 DIAGNOSIS — B96.1: ICD-10-CM

## 2024-09-22 DIAGNOSIS — I27.20: ICD-10-CM

## 2024-09-22 DIAGNOSIS — T38.0X5A: ICD-10-CM

## 2024-09-22 DIAGNOSIS — Z16.11: ICD-10-CM

## 2024-09-22 DIAGNOSIS — K82.8: ICD-10-CM

## 2024-09-22 DIAGNOSIS — D72.828: ICD-10-CM

## 2024-09-22 DIAGNOSIS — R79.89: ICD-10-CM

## 2024-09-22 DIAGNOSIS — U07.1: ICD-10-CM

## 2024-09-22 DIAGNOSIS — Z79.02: ICD-10-CM

## 2024-09-22 DIAGNOSIS — B96.20: ICD-10-CM
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
## 2024-09-22 DIAGNOSIS — Z79.01: ICD-10-CM

## 2024-09-22 DIAGNOSIS — M19.90: ICD-10-CM

## 2024-09-22 DIAGNOSIS — E66.09: ICD-10-CM

## 2024-09-22 DIAGNOSIS — I11.9: ICD-10-CM

## 2024-09-22 DIAGNOSIS — I48.19: ICD-10-CM

## 2024-09-22 DIAGNOSIS — E83.42: ICD-10-CM

## 2024-09-22 DIAGNOSIS — L02.211: ICD-10-CM

## 2024-09-22 DIAGNOSIS — K80.13: Primary | ICD-10-CM

## 2024-09-22 DIAGNOSIS — I08.1: ICD-10-CM

## 2024-09-22 DIAGNOSIS — Z79.899: ICD-10-CM

## 2024-09-22 DIAGNOSIS — K82.1: ICD-10-CM

## 2024-09-22 DIAGNOSIS — G47.33: ICD-10-CM

## 2024-09-22 LAB
ALBUMIN SERPL-MCNC: 3.8 G/DL (ref 3.5–5)
ALP SERPL-CCNC: 178 U/L (ref 38–126)
ALT SERPL-CCNC: 63 U/L (ref 4–34)
ANION GAP SERPL CALC-SCNC: 7 MMOL/L
APTT BLD: 27.8 SEC (ref 22–30)
AST SERPL-CCNC: 168 U/L (ref 14–36)
BASOPHILS # BLD AUTO: 0.1 K/UL (ref 0–0.2)
BASOPHILS NFR BLD AUTO: 1 %
BUN SERPL-SCNC: 19 MG/DL (ref 7–17)
CALCIUM SPEC-MCNC: 8.7 MG/DL (ref 8.4–10.2)
CHLORIDE SERPL-SCNC: 99 MMOL/L (ref 98–107)
CO2 SERPL-SCNC: 28 MMOL/L (ref 22–30)
EOSINOPHIL # BLD AUTO: 0 K/UL (ref 0–0.7)
EOSINOPHIL NFR BLD AUTO: 0 %
ERYTHROCYTE [DISTWIDTH] IN BLOOD BY AUTOMATED COUNT: 4.1 M/UL (ref 3.8–5.4)
ERYTHROCYTE [DISTWIDTH] IN BLOOD: 13.3 % (ref 11.5–15.5)
GLUCOSE SERPL-MCNC: 118 MG/DL (ref 74–99)
HCT VFR BLD AUTO: 38.2 % (ref 34–46)
HGB BLD-MCNC: 12.8 GM/DL (ref 11.4–16)
INR PPP: 1.2 (ref ?–1.2)
LYMPHOCYTES # SPEC AUTO: 1.6 K/UL (ref 1–4.8)
LYMPHOCYTES NFR SPEC AUTO: 16 %
MAGNESIUM SPEC-SCNC: 1.5 MG/DL (ref 1.6–2.3)
MCH RBC QN AUTO: 31.2 PG (ref 25–35)
MCHC RBC AUTO-ENTMCNC: 33.5 G/DL (ref 31–37)
MCV RBC AUTO: 93 FL (ref 80–100)
MONOCYTES # BLD AUTO: 1 K/UL (ref 0–1)
MONOCYTES NFR BLD AUTO: 11 %
NEUTROPHILS # BLD AUTO: 6.9 K/UL (ref 1.3–7.7)
NEUTROPHILS NFR BLD AUTO: 70 %
PLATELET # BLD AUTO: 202 K/UL (ref 150–450)
POTASSIUM SERPL-SCNC: 4 MMOL/L (ref 3.5–5.1)
PROT SERPL-MCNC: 7.5 G/DL (ref 6.3–8.2)
PT BLD: 13 SEC (ref 10–12.5)
SODIUM SERPL-SCNC: 134 MMOL/L (ref 137–145)
WBC # BLD AUTO: 9.9 K/UL (ref 3.8–10.6)

## 2024-09-22 PROCEDURE — 83690 ASSAY OF LIPASE: CPT

## 2024-09-22 PROCEDURE — 83880 ASSAY OF NATRIURETIC PEPTIDE: CPT

## 2024-09-22 PROCEDURE — 71046 X-RAY EXAM CHEST 2 VIEWS: CPT

## 2024-09-22 PROCEDURE — 76705 ECHO EXAM OF ABDOMEN: CPT

## 2024-09-22 PROCEDURE — 87186 SC STD MICRODIL/AGAR DIL: CPT

## 2024-09-22 PROCEDURE — 84484 ASSAY OF TROPONIN QUANT: CPT

## 2024-09-22 PROCEDURE — 70450 CT HEAD/BRAIN W/O DYE: CPT

## 2024-09-22 PROCEDURE — 84443 ASSAY THYROID STIM HORMONE: CPT

## 2024-09-22 PROCEDURE — 96361 HYDRATE IV INFUSION ADD-ON: CPT

## 2024-09-22 PROCEDURE — 85730 THROMBOPLASTIN TIME PARTIAL: CPT

## 2024-09-22 PROCEDURE — 36415 COLL VENOUS BLD VENIPUNCTURE: CPT

## 2024-09-22 PROCEDURE — 87636 SARSCOV2 & INF A&B AMP PRB: CPT

## 2024-09-22 PROCEDURE — 96375 TX/PRO/DX INJ NEW DRUG ADDON: CPT

## 2024-09-22 PROCEDURE — 85025 COMPLETE CBC W/AUTO DIFF WBC: CPT

## 2024-09-22 PROCEDURE — 96366 THER/PROPH/DIAG IV INF ADDON: CPT

## 2024-09-22 PROCEDURE — 83735 ASSAY OF MAGNESIUM: CPT

## 2024-09-22 PROCEDURE — 72125 CT NECK SPINE W/O DYE: CPT

## 2024-09-22 PROCEDURE — 81001 URINALYSIS AUTO W/SCOPE: CPT

## 2024-09-22 PROCEDURE — 99291 CRITICAL CARE FIRST HOUR: CPT

## 2024-09-22 PROCEDURE — 71045 X-RAY EXAM CHEST 1 VIEW: CPT

## 2024-09-22 PROCEDURE — 87075 CULTR BACTERIA EXCEPT BLOOD: CPT

## 2024-09-22 PROCEDURE — 85610 PROTHROMBIN TIME: CPT

## 2024-09-22 PROCEDURE — 83605 ASSAY OF LACTIC ACID: CPT

## 2024-09-22 PROCEDURE — 87205 SMEAR GRAM STAIN: CPT

## 2024-09-22 PROCEDURE — 93306 TTE W/DOPPLER COMPLETE: CPT

## 2024-09-22 PROCEDURE — 87077 CULTURE AEROBIC IDENTIFY: CPT

## 2024-09-22 PROCEDURE — 80053 COMPREHEN METABOLIC PANEL: CPT

## 2024-09-22 PROCEDURE — 96368 THER/DIAG CONCURRENT INF: CPT

## 2024-09-22 PROCEDURE — 88304 TISSUE EXAM BY PATHOLOGIST: CPT

## 2024-09-22 PROCEDURE — 96365 THER/PROPH/DIAG IV INF INIT: CPT

## 2024-09-22 PROCEDURE — 93005 ELECTROCARDIOGRAM TRACING: CPT

## 2024-09-22 PROCEDURE — 87070 CULTURE OTHR SPECIMN AEROBIC: CPT

## 2024-09-22 RX ADMIN — ACETAMINOPHEN STA MG: 500 TABLET ORAL at 22:18

## 2024-09-22 RX ADMIN — ONDANSETRON STA MG: 2 INJECTION INTRAMUSCULAR; INTRAVENOUS at 23:39

## 2024-09-22 RX ADMIN — NICARDIPINE HYDROCHLORIDE ONE MLS/HR: 2.5 INJECTION INTRAVENOUS at 22:19

## 2024-09-22 RX ADMIN — MORPHINE SULFATE STA MG: 2 INJECTION, SOLUTION INTRAMUSCULAR; INTRAVENOUS at 23:43

## 2024-09-22 RX ADMIN — MAGNESIUM SULFATE IN DEXTROSE SCH MLS/HR: 10 INJECTION, SOLUTION INTRAVENOUS at 23:50

## 2024-09-22 NOTE — XR
EXAMINATION TYPE: XR chest 2V

 

DATE OF EXAM: 9/22/2024

 

COMPARISON: NONE

 

HISTORY: Weakness. Cough and fever.

 

TECHNIQUE:  Frontal and lateral views of the chest are obtained.

 

FINDINGS:  There is no focal air space opacity, pleural effusion, or pneumothorax seen.  The cardiac 
silhouette size is mildly enlarged.   The osseous structures are intact.

 

IMPRESSION:  Mild cardiomegaly without acute pulmonary infiltrate.

 

 

[facility], Workstation: [id number], 9/22/2024 10:55 PM

## 2024-09-22 NOTE — ED
Fall HPI





- General


Chief Complaint: Fall


Stated Complaint: Fall - On thinners


Time Seen by Provider: 09/22/24 21:46


Source: patient


Mode of arrival: EMS





- History of Present Illness


Initial Comments: 


Patient is a 74-year-old female presenting today for a fall.  States she was at 

home and felt like her right leg became weak causing her to fall.  She states 

she fell towards her side and then onto her back on hardwood floor.  She denies 

loss of consciousness and hitting her head however her  told her she did 

hit her head against the door.  She currently denies any neck pain, back pain, 

numbness or weakness in her extremities, chest pain, shortness of breath or 

difficulty in breathing, nausea, vomiting, diarrhea, dysuria, urinary frequency.

 Of note per EMS report on their assessment patient's temperature was 101.6 

degrees.  On arrival here it is 100.2.  She took no antipyretics prior to 

arrival.  She states she has had subjective fevers and chills for the last week.

 Additionally states she has had a cough for the last 3 weeks that her doctor 

thought was secondary to lisinopril so I changed her medications however her 

cough is persistent.  Cough is nonproductive and there is no hemoptysis.  

Endorses right sided rib/right sided abdominal pain which she suspects is from 

coughing.








- Related Data


                                Home Medications











 Medication  Instructions  Recorded  Confirmed


 


Metoprolol Succinate (ER) [Toprol 25 mg PO DAILY 11/30/22 09/23/24





XL]   


 


hydroCHLOROthiazide [Hydrodiuril] 12.5 mg PO DAILY 11/30/22 09/23/24


 


Irbesartan 150 mg PO DAILY 09/23/24 09/23/24


 


Loratadine [Claritin] 10 mg PO DAILY 09/23/24 09/23/24








                                  Previous Rx's











 Medication  Instructions  Recorded


 


Acetaminophen Tab [Tylenol] 1,000 mg PO Q6HR PRN #30 tablet 09/27/24


 


Ciprofloxacin HCl [Cipro] 500 mg PO BID 10 Days #20 tab 09/27/24


 


Dabigatran Etexilate Mesylate 150 mg PO BID #0 09/27/24





[Dabigatran Etexilate]  


 


metroNIDAZOLE [Flagyl] 500 mg PO TID #30 tab 09/27/24











                                    Allergies











Allergy/AdvReac Type Severity Reaction Status Date / Time


 


lisinopril AdvReac  Cough Verified 09/23/24 10:38














Review of Systems


ROS Statement: 


Those systems with pertinent positive or pertinent negative responses have been 

documented in the HPI.





ROS Other: All systems not noted in ROS Statement are negative.


Constitutional: Reports: fever, chills, weakness


ENT: Denies: throat pain, congestion


Respiratory: Reports: cough.  Denies: dyspnea, hemoptysis


Cardiovascular: Denies: chest pain, syncope


Gastrointestinal: Reports: abdominal pain.  Denies: nausea, vomiting, diarrhea


Genitourinary: Denies: dysuria, frequency


Musculoskeletal: Denies: back pain


Neurological: Denies: headache, numbness, paresthesias, vertigo





Past Medical History


Past Medical History: Atrial Fibrillation, Hypertension, Osteoarthritis (OA), 

Vascular Disorder


Additional Past Medical History / Comment(s): ankle swelling, environmental 

allergies-gets allergy shots, vericose veins


History of Any Multi-Drug Resistant Organisms: None Reported


Additional Past Surgical History / Comment(s): colonoscopy, TLK, LTK


Past Psychological History: No Psychological Hx Reported


Smoking Status: Never smoker


Past Alcohol Use History: None Reported


Past Drug Use History: None Reported





General Exam





- General Exam Comments


Initial Comments: 





PE:


CONSTITUTIONAL: No apparent distress, well appearing


SKIN: Warm, dry, no jaundice, hives or petechiae


EYES: Pupils are equally round, extraocular movements intact without nystagmus, 

clear conjunctiva, non-icteric sclera


HENT: Normocephalic, atraumatic, moist mucus membranes, oropharynx clear without

 exudates


NECK: , C-collar in place, no midline spinal tenderness to palpation


PULMONARY: Clear to auscultation without wheezes, rhonchi, or rales, normal 

excursion, no accessory muscle use and no stridor


CARDIOVASCULAR: Regular rate, rhythm, normal S1 and S2. No appreciated murmurs, 

rubs or gallops. Strong radial pulses with intact distal perfusion. No lower 

extremity edema


GASTROINTESTINAL: Soft, right-sided abdominal tenderness to palpation, no right 

upper quadrant tenderness, non-distended, no palpable masses, no rebound or 

guarding. No hepatosplenomegaly


MUSCULOSKELETAL: Extremities  have no gross deformity, no edema, redness, or 

swelling.  No midline spinal tenderness to palpation


NEUROLOGIC:_a/o x 3, GCS 15, normal mentation and speech. Moves all extremities 

x 4 without motor or sensory deficit


PSYCHIATRIC:_normal mood and affect, thought process is clear and linear





Limitations: no limitations





Course


                                   Vital Signs











  09/22/24 09/22/24 09/22/24





  21:38 22:15 23:24


 


Temperature 100.2 F H 103.3 F H 


 


Pulse Rate 98  90


 


Respiratory 17  17





Rate   


 


Blood Pressure 108/95  103/41


 


O2 Sat by Pulse 97  95





Oximetry   














  09/23/24





  02:50


 


Temperature 98.4 F


 


Pulse Rate 61


 


Respiratory 17





Rate 


 


Blood Pressure 109/66


 


O2 Sat by Pulse 97





Oximetry 














Medical Decision Making





- Medical Decision Making


Was pt. sent in by a medical professional or institution (, PA, NP, urgent 

care, hospital, or nursing home...) When possible be specific


@ -No


Did you speak to anyone other than the patient for history (EMS, parent, family,

 police, friend...)? What history was obtained from this source 


@ -No


Did you review nursing and triage notes (agree or disagree)? Why? 


@ -I reviewed and agree with nursing and triage notes


Were old charts reviewed (outside hosp., previous admission, EMS record, old 

EKG, old radiological studies, urgent care reports/EKG's, nursing home records)?

 Report findings 


@ -Reviewed op report from colonoscopy performed on 8/18/2020


Differential Diagnosis (chest pain, altered mental status, abdominal pain women,

 abdominal pain men, vaginal bleeding, weakness, fever, dyspnea, syncope, 

headache, dizziness, GI bleed, back pain, seizure, CVA, palpatations, mental 

health, musculoskeletal)? 


@Differential Weakness:


Hypoglycemia, sepsis, hyponatremia, anemia, infection, ACS adverse medicine 

reaction,  this is not meant to be an all-inclusive list.





EKG interpreted by me (3pts min.).


@Atrial fibrillation with RVR, rate 121 bpm, QRS duration 79 ms, QT/QTc 310/382 

ms, normal axis, PVC present, no ST elevations or depressions, no prior 

available for comparison


X-rays interpreted by me (1pt min.).


@No acute cardiopulmonary process, no pneumothorax or consolidations


CT interpreted by me (1pt min.).


@ -No evidence of hemorrhage on CT brain, no evidence of fracture or malalig

nment on CT C-spine


U/S interpreted by me (1pt. min.).


@Dilated CBD noted


What testing was considered but not performed or refused? (CT, X-rays, U/S, 

labs)? Why?


@ -Considered CT abdomen pelvis abdomen pelvis however consider transition to 

ultrasound as noted below


What meds were considered but not given or refused? Why?


@ -None


Did you discuss the management of the patient with other professionals 

(professionals i.e. , PA, NP, lab, RT, psych nurse, , , 

teacher, , )? Give summary


@ -Case discussed with Dr. Martinez, requests admission to medicine, she will 

be on consult


Was smoking cessation discussed for >3mins.?


@ -No


Was critical care preformed (if so, how long)?


@ yes, 35 minutes


Were there social determinants of health that impacted care today? How? 

(Homelessness, low income, unemployed, alcoholism, drug addiction, 

transportation, low edu. Level, literacy, decrease access to med. care, shelter, 

rehab)?


@ -No


Was there de-escalation of care discussed even if they declined (Discuss DNR or 

withdrawal of care, Hospice)? 


@ -No


What co-morbidities impacted this encounter? (DM, HTN, Smoking, COPD, CAD, 

Cancer, CVA, ARF, Chemo, Hep., AIDS, mental health diagnosis, sleep apnea, 

morbid obesity)?


@ -None


Was patient admitted / discharged? Hospital course, mention meds given and 

route, prescriptions, significant lab abnormalities, going to OR and other 

pertinent info.


@ -Hospital course admission- 


patient is a pleasant 75 y/o female history of atrial fibrillation not on blood 

thinners presenting today for a fall at home secondary to weakness.  Patient 

incidentally found to have a fever 101.6 degrees by EMS and 100.2 degrees oral 

here.  Rectal temp was 103.3.  Patient does note subjective fevers and chills 

for the last week and a nonproductive cough for the last 3 weeks.  She currently

 denies headache, neck pain, did not lose consciousness, no back pain, chest 

pain, shortness of breath, pain or weakness in her extremities does note right 

sided abdominal pain that she thinks was present prior to her fall and suspects 

is from all the coughing she has been doing.  Plan for CT brain and C-spine 

secondary to patient's age and possible head injury, x-ray chest due to 

persistent cough and fever, CT abdomen pelvis secondary to fall and fever and 

right-sided abdominal pain, UA, comprehensive labs IV fluids, Tylenol.  Patient 

is agreeable with plan of care.  Of note patient's EKG did show A-fib with RVR 

however I suspect this is secondary to her fever, so we will give fever control 

prior to treating RVR with antiarrhythmics.





CT brain and C-spine negative for acute process or fracture.  Chest x-ray shows 

no signs of pneumonia.  Patient's labs resulted, no leukocytosis or anemia 

however elevated LFTs and alk phos as well as magnesium 1.5.  CT abdomen pelvis 

was switched to an ultrasound of the right upper quadrant due to elevated LFTs 

and pain localized to the right upper quadrant.  On my reassessment patient 

states pain is 5 out of 10 so we will add morphine and Zofran due to patient 

having associated nausea.  Updated patient to plan of care and results thus far.

  Additionally heart rate has improved to high 90s and low 100s after 

administration of Tylenol.





Ultrasound of the gallbladder was reviewed, impression was read as "suboptimal 

study but sonographic findings concerning for acute cholecystitis advised 

surgical evaluation" gallbladder wall 0.6 cm common bile duct 0.8 cm large 

gallstone seen with internal debris's and wall thickening sonographic Freitas 

sign positive. Dr. Martinez, general surgery consulted, kindly recommends 

admission and surgical consultation placed.  Patient started on cefepime and 

Flagyl, pain control.  Updated patient to findings and plan for admission.  She 

is agreeable plan of care.





Discussed case with Dr. Mendoza, kindly accepts patient for admission. Patient 

admitted in stable condition. 





Undiagnosed new problem with uncertain prognosis?


@ -No


Drug Therapy requiring intensive monitoring for toxicity (Heparin, Nitro, 

Insulin, Cardizem)?


@ -No


Were any procedures done?


@ -No


Diagnosis/symptom?


@ -Generalized weakness, acute cholecystitis 


Acute, or Chronic, or Acute on Chronic?


@ -Acute


Uncomplicated (without systemic symptoms) or Complicated (systemic symptoms)?


@Complicated


Side effects of treatment?


@ -No


Exacerbation, Progression, or Severe Exacerbation?


@ -No


Poses a threat to life or bodily function? How? (Chest pain, USA, MI, pneumonia,

 PE, COPD, DKA, ARF, appy, cholecystitis, CVA, Diverticulitis, Homicidal, 

Suicidal, threat to staff... and all critical care pts)


@Yes, if allowed to continue untreated could progress to sepsis and septic shock








- Lab Data


Result diagrams: 


                                 09/27/24 05:57





                                 09/27/24 05:57


                                   Lab Results











  09/22/24 09/22/24 09/22/24 Range/Units





  22:11 22:11 22:11 


 


WBC  9.9    (3.8-10.6)  k/uL


 


RBC  4.10    (3.80-5.40)  m/uL


 


Hgb  12.8    (11.4-16.0)  gm/dL


 


Hct  38.2    (34.0-46.0)  %


 


MCV  93.0    (80.0-100.0)  fL


 


MCH  31.2    (25.0-35.0)  pg


 


MCHC  33.5    (31.0-37.0)  g/dL


 


RDW  13.3    (11.5-15.5)  %


 


Plt Count  202    (150-450)  k/uL


 


MPV  8.6    


 


Neutrophils %  70    %


 


Lymphocytes %  16    %


 


Monocytes %  11    %


 


Eosinophils %  0    %


 


Basophils %  1    %


 


Neutrophils #  6.9    (1.3-7.7)  k/uL


 


Lymphocytes #  1.6    (1.0-4.8)  k/uL


 


Monocytes #  1.0    (0-1.0)  k/uL


 


Eosinophils #  0.0    (0-0.7)  k/uL


 


Basophils #  0.1    (0-0.2)  k/uL


 


PT   13.0 H   (10.0-12.5)  sec


 


INR   1.2 H   (<1.2)  


 


APTT   27.8   (22.0-30.0)  sec


 


Sodium    134 L  (137-145)  mmol/L


 


Potassium    4.0  (3.5-5.1)  mmol/L


 


Chloride    99  ()  mmol/L


 


Carbon Dioxide    28  (22-30)  mmol/L


 


Anion Gap    7  mmol/L


 


BUN    19 H  (7-17)  mg/dL


 


Creatinine    0.78  (0.52-1.04)  mg/dL


 


Est GFR (CKD-EPI)AfAm    87  (>60 ml/min/1.73 sqM)  


 


Est GFR (CKD-EPI)NonAf    75  (>60 ml/min/1.73 sqM)  


 


Glucose    118 H  (74-99)  mg/dL


 


Plasma Lactic Acid Jose     (0.7-2.0)  mmol/L


 


Calcium    8.7  (8.4-10.2)  mg/dL


 


Magnesium    1.5 L  (1.6-2.3)  mg/dL


 


Total Bilirubin    1.2  (0.2-1.3)  mg/dL


 


AST    168 H  (14-36)  U/L


 


ALT    63 H  (4-34)  U/L


 


Alkaline Phosphatase    178 H  ()  U/L


 


Troponin I     (0.000-0.034)  ng/mL


 


NT-Pro-B Natriuret Pep    2460  pg/mL


 


Total Protein    7.5  (6.3-8.2)  g/dL


 


Albumin    3.8  (3.5-5.0)  g/dL


 


TSH    2.150  (0.465-4.680)  mIU/L


 


Urine Color     


 


Urine Appearance     (Clear)  


 


Urine pH     (5.0-8.0)  


 


Ur Specific Gravity     (1.001-1.035)  


 


Urine Protein     (Negative)  


 


Urine Glucose (UA)     (Negative)  


 


Urine Ketones     (Negative)  


 


Urine Blood     (Negative)  


 


Urine Nitrite     (Negative)  


 


Urine Bilirubin     (Negative)  


 


Urine Urobilinogen     (<2.0)  mg/dL


 


Ur Leukocyte Esterase     (Negative)  


 


Urine RBC     (0-5)  /hpf


 


Urine WBC     (0-5)  /hpf


 


Ur Squamous Epith Cells     (0-4)  /hpf


 


Urine Bacteria     (None)  /hpf


 


Hyaline Casts     (0-2)  /lpf


 


Urine Mucus     (None)  /hpf


 


Urine Yeast (Budding)     (None)  /hpf


 


Influenza Type A (PCR)     (Not Detectd)  


 


Influenza Type B (PCR)     (Not Detectd)  


 


RSV (PCR)     (Not Detectd)  


 


SARS-CoV-2 (PCR)     (Not Detectd)  














  09/22/24 09/22/24 09/22/24 Range/Units





  22:11 22:11 23:35 


 


WBC     (3.8-10.6)  k/uL


 


RBC     (3.80-5.40)  m/uL


 


Hgb     (11.4-16.0)  gm/dL


 


Hct     (34.0-46.0)  %


 


MCV     (80.0-100.0)  fL


 


MCH     (25.0-35.0)  pg


 


MCHC     (31.0-37.0)  g/dL


 


RDW     (11.5-15.5)  %


 


Plt Count     (150-450)  k/uL


 


MPV     


 


Neutrophils %     %


 


Lymphocytes %     %


 


Monocytes %     %


 


Eosinophils %     %


 


Basophils %     %


 


Neutrophils #     (1.3-7.7)  k/uL


 


Lymphocytes #     (1.0-4.8)  k/uL


 


Monocytes #     (0-1.0)  k/uL


 


Eosinophils #     (0-0.7)  k/uL


 


Basophils #     (0-0.2)  k/uL


 


PT     (10.0-12.5)  sec


 


INR     (<1.2)  


 


APTT     (22.0-30.0)  sec


 


Sodium     (137-145)  mmol/L


 


Potassium     (3.5-5.1)  mmol/L


 


Chloride     ()  mmol/L


 


Carbon Dioxide     (22-30)  mmol/L


 


Anion Gap     mmol/L


 


BUN     (7-17)  mg/dL


 


Creatinine     (0.52-1.04)  mg/dL


 


Est GFR (CKD-EPI)AfAm     (>60 ml/min/1.73 sqM)  


 


Est GFR (CKD-EPI)NonAf     (>60 ml/min/1.73 sqM)  


 


Glucose     (74-99)  mg/dL


 


Plasma Lactic Acid Jose     (0.7-2.0)  mmol/L


 


Calcium     (8.4-10.2)  mg/dL


 


Magnesium     (1.6-2.3)  mg/dL


 


Total Bilirubin     (0.2-1.3)  mg/dL


 


AST     (14-36)  U/L


 


ALT     (4-34)  U/L


 


Alkaline Phosphatase     ()  U/L


 


Troponin I  <0.012    (0.000-0.034)  ng/mL


 


NT-Pro-B Natriuret Pep     pg/mL


 


Total Protein     (6.3-8.2)  g/dL


 


Albumin     (3.5-5.0)  g/dL


 


TSH     (0.465-4.680)  mIU/L


 


Urine Color    Yellow  


 


Urine Appearance    Cloudy H  (Clear)  


 


Urine pH    5.5  (5.0-8.0)  


 


Ur Specific Gravity    1.029  (1.001-1.035)  


 


Urine Protein    1+ H  (Negative)  


 


Urine Glucose (UA)    Negative  (Negative)  


 


Urine Ketones    1+ H  (Negative)  


 


Urine Blood    Small H  (Negative)  


 


Urine Nitrite    Negative  (Negative)  


 


Urine Bilirubin    Negative  (Negative)  


 


Urine Urobilinogen    4.0  (<2.0)  mg/dL


 


Ur Leukocyte Esterase    Negative  (Negative)  


 


Urine RBC    4  (0-5)  /hpf


 


Urine WBC    2  (0-5)  /hpf


 


Ur Squamous Epith Cells    1  (0-4)  /hpf


 


Urine Bacteria    Rare H  (None)  /hpf


 


Hyaline Casts    1  (0-2)  /lpf


 


Urine Mucus    Moderate H  (None)  /hpf


 


Urine Yeast (Budding)    Rare H  (None)  /hpf


 


Influenza Type A (PCR)   Not Detected   (Not Detectd)  


 


Influenza Type B (PCR)   Not Detected   (Not Detectd)  


 


RSV (PCR)   Not Detected   (Not Detectd)  


 


SARS-CoV-2 (PCR)   Detected A   (Not Detectd)  














  09/23/24 Range/Units





  00:12 


 


WBC   (3.8-10.6)  k/uL


 


RBC   (3.80-5.40)  m/uL


 


Hgb   (11.4-16.0)  gm/dL


 


Hct   (34.0-46.0)  %


 


MCV   (80.0-100.0)  fL


 


MCH   (25.0-35.0)  pg


 


MCHC   (31.0-37.0)  g/dL


 


RDW   (11.5-15.5)  %


 


Plt Count   (150-450)  k/uL


 


MPV   


 


Neutrophils %   %


 


Lymphocytes %   %


 


Monocytes %   %


 


Eosinophils %   %


 


Basophils %   %


 


Neutrophils #   (1.3-7.7)  k/uL


 


Lymphocytes #   (1.0-4.8)  k/uL


 


Monocytes #   (0-1.0)  k/uL


 


Eosinophils #   (0-0.7)  k/uL


 


Basophils #   (0-0.2)  k/uL


 


PT   (10.0-12.5)  sec


 


INR   (<1.2)  


 


APTT   (22.0-30.0)  sec


 


Sodium   (137-145)  mmol/L


 


Potassium   (3.5-5.1)  mmol/L


 


Chloride   ()  mmol/L


 


Carbon Dioxide   (22-30)  mmol/L


 


Anion Gap   mmol/L


 


BUN   (7-17)  mg/dL


 


Creatinine   (0.52-1.04)  mg/dL


 


Est GFR (CKD-EPI)AfAm   (>60 ml/min/1.73 sqM)  


 


Est GFR (CKD-EPI)NonAf   (>60 ml/min/1.73 sqM)  


 


Glucose   (74-99)  mg/dL


 


Plasma Lactic Acid Jose  1.6  (0.7-2.0)  mmol/L


 


Calcium   (8.4-10.2)  mg/dL


 


Magnesium   (1.6-2.3)  mg/dL


 


Total Bilirubin   (0.2-1.3)  mg/dL


 


AST   (14-36)  U/L


 


ALT   (4-34)  U/L


 


Alkaline Phosphatase   ()  U/L


 


Troponin I   (0.000-0.034)  ng/mL


 


NT-Pro-B Natriuret Pep   pg/mL


 


Total Protein   (6.3-8.2)  g/dL


 


Albumin   (3.5-5.0)  g/dL


 


TSH   (0.465-4.680)  mIU/L


 


Urine Color   


 


Urine Appearance   (Clear)  


 


Urine pH   (5.0-8.0)  


 


Ur Specific Gravity   (1.001-1.035)  


 


Urine Protein   (Negative)  


 


Urine Glucose (UA)   (Negative)  


 


Urine Ketones   (Negative)  


 


Urine Blood   (Negative)  


 


Urine Nitrite   (Negative)  


 


Urine Bilirubin   (Negative)  


 


Urine Urobilinogen   (<2.0)  mg/dL


 


Ur Leukocyte Esterase   (Negative)  


 


Urine RBC   (0-5)  /hpf


 


Urine WBC   (0-5)  /hpf


 


Ur Squamous Epith Cells   (0-4)  /hpf


 


Urine Bacteria   (None)  /hpf


 


Hyaline Casts   (0-2)  /lpf


 


Urine Mucus   (None)  /hpf


 


Urine Yeast (Budding)   (None)  /hpf


 


Influenza Type A (PCR)   (Not Detectd)  


 


Influenza Type B (PCR)   (Not Detectd)  


 


RSV (PCR)   (Not Detectd)  


 


SARS-CoV-2 (PCR)   (Not Detectd)  














Disposition


Clinical Impression: 


 Acute cholecystitis, Generalized weakness, Hypomagnesemia





Disposition: ADMITTED AS IP TO THIS HOSP


Condition: Stable

## 2024-09-22 NOTE — CT
EXAMINATION TYPE: CT brain cspine wo con

 

DATE OF EXAM: 9/22/2024

 

COMPARISON: NONE

 

HISTORY: fall on thinners, hit head, no LOC

 

CT DLP: 1415.6 mGycm. Automated Exposure Control for Dose Reduction was Utilized.

 

 

TECHNIQUE: CT scan of the head and cervical spine are performed without contrast.

 

FINDINGS:   There is no acute intracranial hemorrhage or midline shift identified. Mild ventricular a
nd sulcal prominence. Mild low-attenuation in periventricular white matter. The calvarium is intact. 
The globes are intact and the visualized sinuses are clear.

 

Cervical spine is visualized in its entirety from C1 through upper thoracic levels and demonstrates s
light scoliotic curvature without evidence of acute fracture or dislocation.  Prevertebral soft tissu
e appears within normal limits.  The C1-C2 articulation is within normal limits on the coronal images
.  Vertebral body heights are maintained. Moderate disc space narrowing C4-C5 through C6-C7 level is 
present. Axial images show multilevel facet degenerative changes bilaterally. Thyroid gland appears w
ithin normal limits. Lung apices are clear without pneumothorax.

 

IMPRESSION:

1. There is no acute fracture or dislocation evident in the cervical spine.

2. No acute intracranial hemorrhage or midline shift is seen.

 

[facility], Workstation: [id number], 9/22/2024 11:09 PM

## 2024-09-23 LAB
ALBUMIN SERPL-MCNC: 3.4 G/DL (ref 3.5–5)
ALBUMIN/GLOB SERPL: 1 {RATIO}
ALP SERPL-CCNC: 147 U/L (ref 38–126)
ALT SERPL-CCNC: 53 U/L (ref 4–34)
ANION GAP SERPL CALC-SCNC: 1 MMOL/L
AST SERPL-CCNC: 97 U/L (ref 14–36)
BASOPHILS # BLD AUTO: 0 K/UL (ref 0–0.2)
BASOPHILS NFR BLD AUTO: 1 %
BUN SERPL-SCNC: 16 MG/DL (ref 7–17)
CALCIUM SPEC-MCNC: 8.4 MG/DL (ref 8.4–10.2)
CHLORIDE SERPL-SCNC: 105 MMOL/L (ref 98–107)
CO2 SERPL-SCNC: 27 MMOL/L (ref 22–30)
EOSINOPHIL # BLD AUTO: 0 K/UL (ref 0–0.7)
EOSINOPHIL NFR BLD AUTO: 1 %
ERYTHROCYTE [DISTWIDTH] IN BLOOD BY AUTOMATED COUNT: 4.1 M/UL (ref 3.8–5.4)
ERYTHROCYTE [DISTWIDTH] IN BLOOD: 13.4 % (ref 11.5–15.5)
GLOBULIN SER CALC-MCNC: 3.4 G/DL
GLUCOSE SERPL-MCNC: 88 MG/DL (ref 74–99)
HCT VFR BLD AUTO: 39.1 % (ref 34–46)
HGB BLD-MCNC: 12.6 GM/DL (ref 11.4–16)
HYALINE CASTS UR QL AUTO: 1 /LPF (ref 0–2)
KETONES UR QL STRIP.AUTO: (no result)
LYMPHOCYTES # SPEC AUTO: 2.4 K/UL (ref 1–4.8)
LYMPHOCYTES NFR SPEC AUTO: 38 %
MCH RBC QN AUTO: 30.8 PG (ref 25–35)
MCHC RBC AUTO-ENTMCNC: 32.3 G/DL (ref 31–37)
MCV RBC AUTO: 95.3 FL (ref 80–100)
MONOCYTES # BLD AUTO: 0.7 K/UL (ref 0–1)
MONOCYTES NFR BLD AUTO: 11 %
NEUTROPHILS # BLD AUTO: 3 K/UL (ref 1.3–7.7)
NEUTROPHILS NFR BLD AUTO: 46 %
NT-PROBNP SERPL-MCNC: 2460 PG/ML
PH UR: 5.5 [PH] (ref 5–8)
PLATELET # BLD AUTO: 183 K/UL (ref 150–450)
POTASSIUM SERPL-SCNC: 3.9 MMOL/L (ref 3.5–5.1)
PROT SERPL-MCNC: 6.8 G/DL (ref 6.3–8.2)
PROT UR QL: (no result)
RBC UR QL: 4 /HPF (ref 0–5)
SODIUM SERPL-SCNC: 133 MMOL/L (ref 137–145)
SP GR UR: 1.03 (ref 1–1.03)
SQUAMOUS UR QL AUTO: 1 /HPF (ref 0–4)
UROBILINOGEN UR QL STRIP: 4 MG/DL (ref ?–2)
WBC # BLD AUTO: 6.4 K/UL (ref 3.8–10.6)
WBC # UR AUTO: 2 /HPF (ref 0–5)

## 2024-09-23 PROCEDURE — 0W9F40Z DRAINAGE OF ABDOMINAL WALL WITH DRAINAGE DEVICE, PERCUTANEOUS ENDOSCOPIC APPROACH: ICD-10-PCS

## 2024-09-23 PROCEDURE — 0FT44ZZ RESECTION OF GALLBLADDER, PERCUTANEOUS ENDOSCOPIC APPROACH: ICD-10-PCS

## 2024-09-23 PROCEDURE — 0FN44ZZ RELEASE GALLBLADDER, PERCUTANEOUS ENDOSCOPIC APPROACH: ICD-10-PCS

## 2024-09-23 PROCEDURE — 8E0W4CZ ROBOTIC ASSISTED PROCEDURE OF TRUNK REGION, PERCUTANEOUS ENDOSCOPIC APPROACH: ICD-10-PCS

## 2024-09-23 RX ADMIN — METRONIDAZOLE STA MLS/HR: 500 INJECTION, SOLUTION INTRAVENOUS at 01:54

## 2024-09-23 RX ADMIN — POTASSIUM CHLORIDE ONE MLS: 14.9 INJECTION, SOLUTION INTRAVENOUS at 17:56

## 2024-09-23 RX ADMIN — CEFAZOLIN ONE MLS: 330 INJECTION, POWDER, FOR SOLUTION INTRAMUSCULAR; INTRAVENOUS at 17:56

## 2024-09-23 RX ADMIN — AMPICILLIN SODIUM AND SULBACTAM SODIUM SCH MLS/HR: 2; 1 INJECTION, POWDER, FOR SOLUTION INTRAMUSCULAR; INTRAVENOUS at 07:02

## 2024-09-23 RX ADMIN — METRONIDAZOLE SCH MLS/HR: 500 INJECTION, SOLUTION INTRAVENOUS at 15:22

## 2024-09-23 RX ADMIN — METOPROLOL SUCCINATE SCH MG: 25 TABLET, EXTENDED RELEASE ORAL at 11:25

## 2024-09-23 RX ADMIN — PANTOPRAZOLE SODIUM SCH MG: 40 INJECTION, POWDER, FOR SOLUTION INTRAVENOUS at 08:33

## 2024-09-23 RX ADMIN — ONDANSETRON PRN MG: 2 INJECTION INTRAMUSCULAR; INTRAVENOUS at 22:22

## 2024-09-23 RX ADMIN — CEFEPIME HYDROCHLORIDE STA MLS/HR: 2 INJECTION, POWDER, FOR SOLUTION INTRAVENOUS at 01:55

## 2024-09-23 RX ADMIN — LIDOCAINE HYDROCHLORIDE AND EPINEPHRINE ONE ML: 10; 10 INJECTION, SOLUTION INFILTRATION; PERINEURAL at 18:18

## 2024-09-23 RX ADMIN — POTASSIUM CHLORIDE SCH MLS/HR: 14.9 INJECTION, SOLUTION INTRAVENOUS at 04:16

## 2024-09-23 NOTE — US
EXAMINATION TYPE: US gallbladder

 

DATE OF EXAM: 9/23/2024

 

COMPARISON: NONE

 

CLINICAL INDICATION: Female, 74 years old with history of fever, elevated LFTs, RUQ TTP; Covid+ RUQ t
enderness

 

TECHNIQUE: Multiple sonographic images of the right upper quadrant are obtained.

 

FINDINGS:

 

EXAM MEASUREMENTS:

 

Liver Length:  15.2 cm   

Gallbladder Wall:  0.6 cm   

CBD:  0.8 cm

Right Kidney:  9.2 x 3.5 x 4.4 cm

 

SONOGRAPHER NOTES:bowel gas limits exam

 

Pancreas:  not seen due to bowel gas

Liver:  very difficult to penetrate  

Gallbladder:  large stone seen, internal debris with wall thickening

**Evidence for sonographic Freitas's sign:  yes

CBD:  wnl 

Right Kidney:  wnl 

 

 

 

IMPRESSION: Suboptimal study but the sonographic findings are concerning for acute cholecystitis. Adv
ise surgical evaluation.

 

 

[facility], Workstation: [id number], 9/23/2024 12:52 AM

## 2024-09-23 NOTE — P.GSCN
History of Present Illness


Consult date: 09/23/24


History of present illness: 





CHIEF COMPLAINT: Fall





HISTORY OF PRESENT ILLNESS: This is a 74-year-old female who presented with fall

and weakness.  She reports her legs gave out.  Patient reports that she has not 

been feeling well for about 3 weeks.  Patient reports having right upper 

quadrant abdominal pain with nausea and vomiting and a decreased appetite.  She 

is also been having a cough, fevers and chills and sweats for 3 weeks.  She had 

a temp as high as 103.3 last night.  She is COVID-positive.  Abdominal 

ultrasound had reported concerns for acute cholecystitis with large gallstone, 

debris in the gallbladder and thickened gallbladder wall with positive Freitas 

sign.  Patient started on IV antibiotics.  Patient has a history of atrial 

fibrillation and is on Pradaxa.  Last dose of Pradaxa was on Friday.  Patient 

denies any prior abdominal surgeries.





PAST MEDICAL HISTORY: 


See list.





PAST SURGICAL HISTORY: 


See list.





MEDICATIONS: 


See list.





ALLERGIES: 


See list.





SOCIAL HISTORY: No illicit drug use.  





REVIEW OF SYSTEMS: 


CONSTITUTIONAL: Denies fever or chills.


HEENT: Denies blurred vision, vision changes, or eye pain. Denies hemoptysis 


ENDOCRINE: Denies heat or cold intolerance.


CARDIOVASCULAR: Denies chest pain or pressure.


RESPIRATORY: No shortness of breath. 


GASTROINTESTINAL: Please refer to HPI otherwise unremarkable


NEURO: Denies history of seizures.


PSYCH: No depression or suicidal ideation


HEMATOLOGIC: Denies bleeding disorders.


LYMPHATIC:  The patient denies any lumps and bumps around the neck. 


GENITOURINARY:  Denies any blood in urine or increased urinary frequency.  


MUSCULOSKELETAL:  Denies myalgias. Denies joint swelling. Denies decreased range

of motion beyond patients baseline.


SKIN: Denies pruitis. Denies rash.





PHYSICAL EXAM: 


VITAL SIGNS: Reviewed


GENERAL: Well-developed in no acute distress. 


HEENT:  No sclera icterus. Extraocular movements grossly intact.  Moist buccal 

mucosa. 


Head is atraumatic, normocephalic. Hears conversational speech. No nasal 

drainage.


NECK:  Supple without lymphadenopathy.


CHEST:  Non-labored respirations and equal bilateral excursions. 


CARDIOVASCULAR:  Palpable 2+ radial pulses.


ABDOMEN:  Soft.  Nondistended.  Mild tenderness to palpation to right upper 

quadrant


MUSCULOSKELETAL:  No clubbing or cyanosis.


NEUROLOGIC:  No focal or lateralizing signs.  Cranial nerves II through XII 

grossly intact.


PSYCH:  Appropriate affect.  Alert and oriented to person, place and time.


SKIN: Well perfused.  Good skin turgor. 





LABORATORY DATA:


WBC 6.4 Hgb 12.6 platelets 183


Sodium 133 potassium 3.9 creatinine 0.66


Total bilirubin 0.7  down to 97 ALT 63 down to 53 alk phos 178 down to 

147





IMAGING:


US reports suboptimal study findings are concerning for acute cholecystitis





CT head and cervical spine reports no acute fracture or dislocation no 

intracranial hemorrhage





Echo pending


ASSESSMENT: 


1.  Acute cholecystitis


2.  Fall with weakness


3.  History of atrial fibrillation on Pradaxa at home


4.  Mildly elevated LFTs





PLAN: 


-Patient scheduled for Robotic cholecystectomy today with Dr. Martinez


-Cardiology consulted for cardiac risk assessment.  Discussed with cardiology 

nurse practitioner they reported no absolute contraindication to proceed with 

surgery


-Hold Pradaxa


-Continue antibiotics


-Increase IV fluids to 75 mL/h due to her n.p.o. status





Physician Assistant note has been reviewed by physician. Signing provider agrees

with the documented findings, assessment, and plan of care.





Past Medical History


Past Medical History: Atrial Fibrillation, Hypertension, Osteoarthritis (OA), 

Vascular Disorder


Additional Past Medical History / Comment(s): ankle swelling, environmental 

allergies-gets allergy shots, vericose veins


History of Any Multi-Drug Resistant Organisms: None Reported


Additional Past Surgical History / Comment(s): colonoscopy, TLK, LTK


Past Psychological History: No Psychological Hx Reported


Smoking Status: Never smoker


Past Alcohol Use History: None Reported


Past Drug Use History: None Reported





Medications and Allergies


                                Home Medications











 Medication  Instructions  Recorded  Confirmed  Type


 


Metoprolol Succinate (ER) [Toprol 25 mg PO DAILY 11/30/22 09/23/24 History





XL]    


 


hydroCHLOROthiazide [Hydrodiuril] 12.5 mg PO DAILY 11/30/22 09/23/24 History


 


Dabigatran Etexilate Mesylate 150 mg PO BID 09/23/24 09/23/24 History





[Dabigatran Etexilate]    


 


Irbesartan 150 mg PO DAILY 09/23/24 09/23/24 History


 


Loratadine [Claritin] 10 mg PO DAILY 09/23/24 09/23/24 History








                                    Allergies











Allergy/AdvReac Type Severity Reaction Status Date / Time


 


lisinopril AdvReac  Cough Verified 09/23/24 10:38














Surgical - Exam


                                   Vital Signs











Temp Pulse Resp BP Pulse Ox


 


 100.2 F H  98   17   108/95   97 


 


 09/22/24 21:38  09/22/24 21:38  09/22/24 21:38  09/22/24 21:38  09/22/24 21:38














Results





- Labs





                                 09/23/24 08:50





                                 09/23/24 08:50


                  Abnormal Lab Results - Last 24 Hours (Table)











  09/22/24 09/22/24 09/22/24 Range/Units





  22:11 22:11 22:11 


 


PT  13.0 H    (10.0-12.5)  sec


 


INR  1.2 H    (<1.2)  


 


Sodium   134 L   (137-145)  mmol/L


 


BUN   19 H   (7-17)  mg/dL


 


Glucose   118 H   (74-99)  mg/dL


 


Magnesium   1.5 L   (1.6-2.3)  mg/dL


 


AST   168 H   (14-36)  U/L


 


ALT   63 H   (4-34)  U/L


 


Alkaline Phosphatase   178 H   ()  U/L


 


Urine Appearance     (Clear)  


 


Urine Protein     (Negative)  


 


Urine Ketones     (Negative)  


 


Urine Blood     (Negative)  


 


Urine Bacteria     (None)  /hpf


 


Urine Mucus     (None)  /hpf


 


Urine Yeast (Budding)     (None)  /hpf


 


SARS-CoV-2 (PCR)    Detected A  (Not Detectd)  














  09/22/24 Range/Units





  23:35 


 


PT   (10.0-12.5)  sec


 


INR   (<1.2)  


 


Sodium   (137-145)  mmol/L


 


BUN   (7-17)  mg/dL


 


Glucose   (74-99)  mg/dL


 


Magnesium   (1.6-2.3)  mg/dL


 


AST   (14-36)  U/L


 


ALT   (4-34)  U/L


 


Alkaline Phosphatase   ()  U/L


 


Urine Appearance  Cloudy H  (Clear)  


 


Urine Protein  1+ H  (Negative)  


 


Urine Ketones  1+ H  (Negative)  


 


Urine Blood  Small H  (Negative)  


 


Urine Bacteria  Rare H  (None)  /hpf


 


Urine Mucus  Moderate H  (None)  /hpf


 


Urine Yeast (Budding)  Rare H  (None)  /hpf


 


SARS-CoV-2 (PCR)   (Not Detectd)  








                                 Diabetes panel











  09/22/24 Range/Units





  22:11 


 


Sodium  134 L  (137-145)  mmol/L


 


Potassium  4.0  (3.5-5.1)  mmol/L


 


Chloride  99  ()  mmol/L


 


Carbon Dioxide  28  (22-30)  mmol/L


 


BUN  19 H  (7-17)  mg/dL


 


Creatinine  0.78  (0.52-1.04)  mg/dL


 


Glucose  118 H  (74-99)  mg/dL


 


Calcium  8.7  (8.4-10.2)  mg/dL


 


AST  168 H  (14-36)  U/L


 


ALT  63 H  (4-34)  U/L


 


Alkaline Phosphatase  178 H  ()  U/L


 


Total Protein  7.5  (6.3-8.2)  g/dL


 


Albumin  3.8  (3.5-5.0)  g/dL








                                  Thyroid panel











  09/22/24 Range/Units





  22:11 


 


TSH  2.150  (0.465-4.680)  mIU/L








                                  Calcium panel











  09/22/24 Range/Units





  22:11 


 


Calcium  8.7  (8.4-10.2)  mg/dL


 


Albumin  3.8  (3.5-5.0)  g/dL








                                 Pituitary panel











  09/22/24 Range/Units





  22:11 


 


Sodium  134 L  (137-145)  mmol/L


 


Potassium  4.0  (3.5-5.1)  mmol/L


 


Chloride  99  ()  mmol/L


 


Carbon Dioxide  28  (22-30)  mmol/L


 


BUN  19 H  (7-17)  mg/dL


 


Creatinine  0.78  (0.52-1.04)  mg/dL


 


Glucose  118 H  (74-99)  mg/dL


 


Calcium  8.7  (8.4-10.2)  mg/dL


 


TSH  2.150  (0.465-4.680)  mIU/L








                                  Adrenal panel











  09/22/24 Range/Units





  22:11 


 


Sodium  134 L  (137-145)  mmol/L


 


Potassium  4.0  (3.5-5.1)  mmol/L


 


Chloride  99  ()  mmol/L


 


Carbon Dioxide  28  (22-30)  mmol/L


 


BUN  19 H  (7-17)  mg/dL


 


Creatinine  0.78  (0.52-1.04)  mg/dL


 


Glucose  118 H  (74-99)  mg/dL


 


Calcium  8.7  (8.4-10.2)  mg/dL


 


Total Bilirubin  1.2  (0.2-1.3)  mg/dL


 


AST  168 H  (14-36)  U/L


 


ALT  63 H  (4-34)  U/L


 


Alkaline Phosphatase  178 H  ()  U/L


 


Total Protein  7.5  (6.3-8.2)  g/dL


 


Albumin  3.8  (3.5-5.0)  g/dL

## 2024-09-23 NOTE — CA
Transthoracic Echo Report 

 Name: Kristi Mackey 

 MRN:    [id number] 

 Age:    74     Gender:     F 

 

 :    1950 

 Exam Date:     2024 07:32 

 Exam Location: [facility] 

 Ht (in):     64     Wt (lb):     195 

 Ordering Physician:        Sue Martinez MD 

 Attending/Referring Phys:         Juan HOGAN 

 Technician         Leticia Anna RDCS 

 Procedure CPT: 

 Indications:       afib 

 

 Cardiac Hx: 

 Technical Quality:      Fair 

 Contrast 1:                                Total Dose (mL): 

 Contrast 2:                                Total Dose (mL): 

 

 MEASUREMENTS  (Male / Female) Normal Values 

 2D ECHO 

 LV Diastolic Diameter PLAX        4.8 cm                4.2 - 5.9 / 3.9 - 5.3 cm 

 LV Systolic Diameter PLAX         3.4 cm                 

 IVS Diastolic Thickness           0.7 cm                0.6 - 1.0 / 0.6 - 0.9 cm 

 LVPW Diastolic Thickness          0.7 cm                0.6 - 1.0 / 0.6 - 0.9 cm 

 LV Relative Wall Thickness        0.3                    

 LVOT Diameter                     1.9 cm                 

 LA Volume                         119.1 cm???             18 - 58 / 22 - 52 cm??? 

 LA Volume Index                   58.5 cm???/m???           16 - 28 cm???/m??? 

 Ascending Aorta Diameter          3.3 cm                 

 

 DOPPLER 

 AV Peak Velocity                  165.5 cm/s             

 AV Peak Gradient                  11.0 mmHg              

 AV Mean Velocity                  108.3 cm/s             

 AV Mean Gradient                  5.4 mmHg               

 AV Velocity Time Integral         33.7 cm                

 LVOT Peak Velocity                110.9 cm/s             

 LVOT Peak Gradient                4.9 mmHg               

 LVOT Velocity Time Integral       25.5 cm                

 LVOT Stroke Volume                68.9 cm???               

 LVOT Stroke Volume Index          35.6 ml/m???             

 LVOT Cardiac Index                2135.5 cm???/min???m???      

 AV Area Cont Eq vti               2.0 cm???                

 AV Area Cont Eq pk                1.8 cm???                

 TR Peak Velocity                  332.6 cm/s             

 TR Peak Gradient                  44.2 mmHg              

 Right Atrial Pressure             10.0 mmHg              

 Pulmonary Artery Systolic Pressu  54.2 mmHg              

 Right Ventricular Systolic Press  54.2 mmHg              

 PV Peak Velocity                  93.9 cm/s              

 PV Peak Gradient                  3.5 mmHg               

 

 

 FINDINGS 

 Left Ventricle 

 Left ventricular ejection fraction is estimated at 55-60 %. Left ventricular  

 cavity size normal. Left ventricular wall thickness normal. No obvious regional  

 wall motion abnormalities. 

 

 Right Ventricle 

 Right ventricular dilatation with normal function. Moderate to severe pulmonary  

 hypertension. Right ventricular systolic pressure estimated at 54mm hg. 

 

 Right Atrium 

 Severe right atrial dilatation. 

 

 Left Atrium 

 Severely increased left atrial volume. Moderately increased left atrial area. 

 

 Mitral Valve 

 Structurally normal mitral valve. No evidence for mitral valve prolapse. No  

 mitral stenosis. Mild mitral regurgitation. 

 

 Aortic Valve 

 Trileaflet aortic valve. Aortic valve sclerosis. No aortic stenosis. No aortic  

 regurgitation. 

 

 Tricuspid Valve 

 Structurally normal tricuspid valve. No tricuspid stenosis. Moderate tricuspid  

 regurgitation. 

 

 Pulmonic Valve 

 Pulmonic valve not well visualized. No pulmonic stenosis. Trace pulmonic  

 regurgitation. 

 

 Pericardium 

 No pericardial effusion. 

 

 Aorta 

 Normal size aortic root and proximal ascending aorta. 

 

 CONCLUSIONS 

 Normal biventricular systolic function 

 Severe biatrial enlargement 

 Severe pulmonary hypertension 

 Enlarged RV 

 At least moderate tricuspid regurgitation 

 Aortic sclerosis with no stenosis or insufficiency 

 Thickened mitral valve leaflets 

 Previewed by:  

 Dr. Peng Fox MD 

 (Electronically Signed) 

 Final Date:      2024 12:01

## 2024-09-23 NOTE — P.HPIM
History of Present Illness


H&P Date: 09/23/24


History of present illness; 74-year-old woman who presented to the emergency 

department with history of fall, 3 weeks of persistent cough and 3 weeks of 

right upper quadrant abdominal pain.  She has a past medical history significant

for atrial fibrillation, hypertension and osteoarthritis.  Patient states that 

she was at home and felt her right leg become weak causing her to fall.  States 

she fell towards her side and then onto her back on hardwood floor.  Denies any 

loss of consciousness or hitting her head, however her  told her she did 

hit her head against the door.  Patient denies neck pain, back pain, numbness or

weakness in her extremities, chest pain, shortness of breath or difficulty in 

breathing, nausea, vomiting, diarrhea, dysuria, increased urinary frequency.  

Per the EMS, patient's temperature was 101.6, on arrival to the emergency 

department was 100.2 and the patient states she took no antipyretics prior to 

arrival in the emergency department.  Rectal temperature done in the emergency 

department showed a temperature of 103.3.  Patient states that she has had some 

fevers and chills for the last week, and states she had a cough for the last 3 

weeks that her doctor thought was secondary to her lisinopril use so changed her

medication, she states the cough is still present.  Notes that the cough is 

nonproductive and there is no blood or sputum.  She does states she has some 

right-sided rib/abdominal pain which she suspected was from coughing, she states

her primary care without the same.  However following resolution to the 

emergency department and patient's abdominal tenderness in the right upper 

quadrant and imaging studies patient was shown to have acute cholecystitis.  

Surgery has been consulted, patient being held n.p.o. in preparation for surgery

later today (9/23).


 


Initial lab work done in the ER showed WBCs 9.9, Hgb 12.8, Hct 38.2, , PT

13.0, INR 1.2, PTT 27.8, sodium 134, potassium 4.0, BUN 19, creatinine 0.70, 

magnesium 1.5, , ALT 63, alkaline phosphatase 178


Urinalysis showed cloudy appearance, 1+ protein, 1+ ketones, small amount of 

blood, rare bacteria, moderate urine mucus, rare yeast


 


Influenza A not detected


Influenza B not detected


RSV not detected


COVID-19 positive


 


EKG done in the ER showed heart rate of 121, no ST segment elevation or 

depression seen, no T-wave inversions seen.  A-fib with RVR; emergency 

department suspect secondary to fever, will control fever and treat RVR with 

antiarrhythmics


 


Chest x-ray done in the ER showed mild cardiomegaly without acute pulmonary 

infiltrates


CT head/cervical spine done showed no acute fracture or dislocation evidence of 

cervical spine; no acute intracranial hemorrhage or midline shift 


Gallbladder ultrasound showed a suboptimal study but the sonographic findings 

are concerning for acute cholecystitis


 


Patient admitted to internal medicine service


 


REVIEW OF SYSTEMS: 


CONSTITUTIONAL: Fevers, chills, weakness


HEENT: No recent visual problems or hearing problems. Denied any sore throat. 


CARDIOVASCULAR: No chest pain, orthopnea, PND, no palpitations, no syncope. 


PULMONARY: No shortness of breath, no hemoptysis.  Reports cough.


GASTROINTESTINAL: No diarrhea.  Has had nausea and vomiting, last vomit on 

Saturday (9/21).  Reports abdominal pain.


NEUROLOGICAL: No headaches, no weakness, no numbness. 


HEMATOLOGICAL: Denies any bleeding or petechiae. 


GENITOURINARY: Denies any burning micturition, frequency, or urgency. 


MUSCULOSKELETAL/RHEUMATOLOGICAL: Denies any joint pain, swelling, or any muscle 

pain. 


ENDOCRINE: Denies any polyuria or polydipsia. 


 


The rest of the 14-point review of systems is negative.


 


PHYSICAL EXAMINATION: 


 


GENERAL: The patient is alert and oriented x3, not in any acute distress. Well 

developed, well nourished. 


HEENT: Pupils are round and equally reacting to light. EOMI. No scleral icterus.

No conjunctival pallor. Normocephalic, atraumatic. No pharyngeal erythema. No 

thyromegaly. 


CARDIOVASCULAR: S1 and S2 present. No murmurs, rubs, or gallops.


NECK: C-collar in place, no midline spinal tenderness to palpation


PULMONARY: Chest is clear to auscultation, no wheezing or crackles. 


ABDOMEN: Soft, and nondistended.  Tenderness to the right upper quadrant.


MUSCULOSKELETAL: No joint swelling or deformity.


EXTREMITIES: No cyanosis, clubbing, or pedal edema. 


NEUROLOGICAL: Gross neurological examination did not reveal any focal deficits. 


SKIN: No rashes. 


 


Assessment and plan





# Acute cholecystitis


 Patient presented with right-sided abdominal pain, which she thought was 

secondary to coughing


 Patient presented with a fever, without elevated white count


 Gallbladder ultrasound completed which was determined to be a suboptimal study

however sonographic findings are concerning for acute cholecystitis; surgery was

consulted


 Patient given 2 g cefepime, 500 mg Flagyl each once


 Patient given 3 g Unasyn every 6 hours per surgery recommendation; switch to 

Rocephin and Flagyl


 Patient has tramadol every 6 hours and morphine 4 mg every 4 hours for pain 

control


 Patient made n.p.o. and preparation for surgery today (9/23)





# Mild COVID-19 infection


 Patient tested positive for COVID-19


 On arrival patient was saturating 97% on room air, currently saturating 94% on

room air


 Patient has had persistent cough for 3 weeks; 3-week history of fever, 

uncertain origin COVID vs cholecystitis





# History of atrial fibrillation


 Patient has a history of atrial fibrillation


 Noted on EKG in the emergency department atrial fibrillation with RVR


 Patient regularly takes Pradaxa; currently may hold - last dose taken Friday 

per the patient





GI prophylaxis: Protonix 40 mg daily


 


 


Continue to monitor vital signs, monitor CBC, monitor CMP, continue telemetry 

monitoring


Labs and medication were reviewed.  Continue with symptomatic treatment. 


 


Dictation was produced using dragon dictation software. please excuse any 

grammatical, word or spelling errors.





Attestation: I have personally seen and examined the patient with Resident, 

reviewed the documentation and participated and agree with the assessment and 

plan as written. 


Saran Kign MD





Past Medical History


Past Medical History: Atrial Fibrillation, Hypertension, Osteoarthritis (OA), 

Vascular Disorder


Additional Past Medical History / Comment(s): ankle swelling, environmental 

allergies-gets allergy shots, vericose veins


History of Any Multi-Drug Resistant Organisms: None Reported


Additional Past Surgical History / Comment(s): colonoscopy, TLK, LTK


Past Psychological History: No Psychological Hx Reported


Smoking Status: Never smoker


Past Alcohol Use History: None Reported


Past Drug Use History: None Reported





Medications and Allergies


                                Home Medications











 Medication  Instructions  Recorded  Confirmed  Type


 


Metoprolol Succinate (ER) [Toprol 25 mg PO DAILY 11/30/22 09/23/24 History





XL]    


 


hydroCHLOROthiazide [Hydrodiuril] 12.5 mg PO DAILY 11/30/22 09/23/24 History


 


Irbesartan 150 mg PO DAILY 09/23/24 09/23/24 History


 


Loratadine [Claritin] 10 mg PO DAILY 09/23/24 09/23/24 History


 


Acetaminophen Tab [Tylenol] 1,000 mg PO Q6HR PRN #30 tablet 09/27/24  Rx


 


Ciprofloxacin HCl [Cipro] 500 mg PO BID 10 Days #20 tab 09/27/24  Rx


 


Dabigatran Etexilate Mesylate 150 mg PO BID #0 09/27/24 09/23/24 Rx





[Dabigatran Etexilate]    


 


metroNIDAZOLE [Flagyl] 500 mg PO TID #30 tab 09/27/24  Rx








                                    Allergies











Allergy/AdvReac Type Severity Reaction Status Date / Time


 


lisinopril AdvReac  Cough Verified 09/23/24 10:38














Physical Exam


Vitals: 


                                   Vital Signs











  Temp Pulse Pulse Resp BP BP Pulse Ox


 


 09/23/24 07:00  98.6 F   63  16   109/74  94 L


 


 09/23/24 02:50  98.4 F  61   17  109/66   97


 


 09/22/24 23:24   90   17  103/41   95


 


 09/22/24 22:15  103.3 F H      


 


 09/22/24 21:38  100.2 F H  98   17  108/95   97








                                Intake and Output











 09/22/24 09/23/24 09/23/24





 22:59 06:59 14:59


 


Other:   


 


  Weight 90.265 kg 90.265 kg 














Results


CBC & Chem 7: 


                                 09/27/24 05:57





                                 09/27/24 05:57


Labs: 


                  Abnormal Lab Results - Last 24 Hours (Table)











  09/22/24 09/22/24 09/22/24 Range/Units





  22:11 22:11 22:11 


 


PT  13.0 H    (10.0-12.5)  sec


 


INR  1.2 H    (<1.2)  


 


Sodium   134 L   (137-145)  mmol/L


 


BUN   19 H   (7-17)  mg/dL


 


Glucose   118 H   (74-99)  mg/dL


 


Magnesium   1.5 L   (1.6-2.3)  mg/dL


 


AST   168 H   (14-36)  U/L


 


ALT   63 H   (4-34)  U/L


 


Alkaline Phosphatase   178 H   ()  U/L


 


Urine Appearance     (Clear)  


 


Urine Protein     (Negative)  


 


Urine Ketones     (Negative)  


 


Urine Blood     (Negative)  


 


Urine Bacteria     (None)  /hpf


 


Urine Mucus     (None)  /hpf


 


Urine Yeast (Budding)     (None)  /hpf


 


SARS-CoV-2 (PCR)    Detected A  (Not Detectd)  














  09/22/24 Range/Units





  23:35 


 


PT   (10.0-12.5)  sec


 


INR   (<1.2)  


 


Sodium   (137-145)  mmol/L


 


BUN   (7-17)  mg/dL


 


Glucose   (74-99)  mg/dL


 


Magnesium   (1.6-2.3)  mg/dL


 


AST   (14-36)  U/L


 


ALT   (4-34)  U/L


 


Alkaline Phosphatase   ()  U/L


 


Urine Appearance  Cloudy H  (Clear)  


 


Urine Protein  1+ H  (Negative)  


 


Urine Ketones  1+ H  (Negative)  


 


Urine Blood  Small H  (Negative)  


 


Urine Bacteria  Rare H  (None)  /hpf


 


Urine Mucus  Moderate H  (None)  /hpf


 


Urine Yeast (Budding)  Rare H  (None)  /hpf


 


SARS-CoV-2 (PCR)   (Not Detectd)  














Thrombosis Risk Factor Assmnt





- Choose All That Apply


Each Factor Represents 1 point: Obesity (BMI >25), Varicose veins


Each Risk Factor Represents 2 Points: Age 61-74 years


Thrombosis Risk Factor Assessment Total Risk Factor Score: 4


Thrombosis Risk Factor Assessment Level: Moderate Risk

## 2024-09-23 NOTE — P.CRDCN
History of Present Illness


History of present illness: 





HISTORY OF PRESENT ILLNESS:  





This is a 74-year-old female with a past medical history significant for 

persistent atrial fibrillation, hypertension, obstructive sleep apnea, and 

obesity. Patient follows in the office with Dr. Araujo. We have been asked to see

the patient in consultation for cardiac risk assessment. Patient examined at the

bedside.  Patient initially presented to the hospital secondary to a fall.  

Patient's fall was mechanical and there was no syncope noted.  Patient was noted

to be febrile.  She was tested for COVID and was found to be positive.  Patient 

was febrile overnight with a Tmax of 103.3.  The patient currently denies any 

chest pain or pressure.  She denies any shortness of breath.  She does report 

she has had a cough for the past couple weeks.  Vital signs are stable this 

morning.





DIAGNOSTICS:


- EKG reveals atrial fibrillation with RVR


- Chest xray mild cardiomegaly without acute pulmonary infiltrate


- Gallbladder ultrasound: Suboptimal study but sonographic findings are 

concerning for acute cholecystitis.  Large gallbladder stone seen with internal 

debris with wall thickening.


- Laboratory data: WBC 6.4.  Hemoglobin 12.6.  Platelet count 183.  Sodium 133. 

Potassium 3.9.  BUN 16.  Creatinine 0.66.  Bilirubin 0.7.  AST 97.  ALT 53.  TSH

2.150.


- Current home cardiac medication list has not been updated at the time of 

dictation


- Most recent echocardiogram obtained in December 2023 revealed EF 55%, mild to 

moderate mitral regurgitation, moderate tricuspid regurgitation, moderate 

pulmonary hypertension


- Patient underwent Lexiscan stress test in December 2021 which was negative for

ischemia


- Cardiac catheterization history: Patient denies





REVIEW OF SYSTEMS: 


At the time of my exam:


CONSTITUTIONAL: Denies fever or chills.


HEENT: Denies blurred vision, vision changes, or eye pain. Denies hemoptysis 


CARDIOVASCULAR: Denies chest pain. Denies orthopnea. Denies PND. Denies 

palpitations


RESPIRATORY: Denies shortness of breath. 


GASTROINTESTINAL: Denies abdominal pain. Denies nausea or vomiting. 


HEMATOLOGIC: Denies bleeding disorders.


GENITOURINARY:  Denies any blood in urine.


SKIN: Denies pruitis. Denies rash.





PHYSICAL EXAM: 


VITAL SIGNS: Reviewed.


GENERAL: Well-developed in no acute distress. 


HEENT: Head is normocephalic. Pupils are equal, round. Sclerae anicteric. Mucous

membranes of the mouth are moist. Neck supple. No JVD or thyromegaly


LUNGS: Respirations even and unlabored. Lungs essentially clear to auscultation 

bilaterally.


HEART: Irregular rate and rhythm.  S1 and S2 heard.


ABDOMEN: Soft. Nondistended. Nontender.


EXTREMITIES: Normal range of motion.  No clubbing or cyanosis.  Peripheral 

pulses intact.  No lower extremity edema


NEUROLOGIC: Awake and alert. Oriented x 3. 





ASSESSMENT:


Status post mechanical fall without syncope


Acute COVID-19


Acute cholecystitis


Elevated LFTs


Persistent atrial fibrillation, on Pradaxa outpatient


Hypertension


Obstructive sleep apnea with CPAP use


Obesity: BMI 34.2





PLAN: 


Obtain 2D echo to assess cardiac structure and function


Hold Pradaxa.  Resume postoperatively when cleared by general surgery


Resume metoprolol.  Hold lisinopril and hydrochlorothiazide due to soft blood 

pressures


Continue telemetry monitoring


There are no absolute contraindications for patient to proceed with 

cholecystectomy from a cardiac standpoint


Further recommendations pending patient course


Patient to follow-up postdischarge with Dr. Araujo








Nurse practitioner note has been reviewed by physician. Signing provider agrees 

with the documented findings, assessment, and plan of care documented by NP as a

scribe.








Past Medical History


Past Medical History: Atrial Fibrillation, Hypertension, Osteoarthritis (OA), 

Vascular Disorder


Additional Past Medical History / Comment(s): ankle swelling, environmental 

allergies-gets allergy shots, vericose veins


History of Any Multi-Drug Resistant Organisms: None Reported


Additional Past Surgical History / Comment(s): colonoscopy, TLK, LTK


Past Psychological History: No Psychological Hx Reported


Smoking Status: Never smoker


Past Alcohol Use History: None Reported


Past Drug Use History: None Reported





Medications and Allergies


                                Home Medications











 Medication  Instructions  Recorded  Confirmed  Type


 


lisinopriL [Zestril] 20 mg PO DAILY 11/29/22 12/02/22 History


 


Ascorbic Acid [Vitamin C] 500 mg PO DAILY 11/30/22 12/02/22 History


 


Cholecalciferol [Vitamin D3 (25 25 mcg PO DAILY 11/30/22 12/02/22 History





Mcg = 1000 Iu)]    


 


Ipratropium Bromide 0.06%Nasal 2 spray EA NOSTRIL BID 11/30/22 12/02/22 History





[Atrovent Nasal 0.06%]    


 


Metoprolol Succinate (ER) [Toprol 25 mg PO DAILY 11/30/22 12/02/22 History





XL]    


 


Rivaroxaban [Xarelto] 20 mg PO HS 11/30/22 12/02/22 History


 


Sertraline [Zoloft] 50 mg PO DAILY 11/30/22 12/02/22 History


 


hydroCHLOROthiazide [Hydrodiuril] 12.5 mg PO DAILY 11/30/22 12/02/22 History


 


Docusate [Colace] 100 mg PO BID #60 capsule 12/04/22  Rx


 


Doxycycline Monohydrate 100 mg PO BID 14 Days #28 cap 12/04/22  Rx


 


HYDROcodone/APAP 5-325MG [Norco 1 - 2 tab PO Q6HR PRN 7 Days #32 12/04/22  Rx





5-325] tab   


 


Omeprazole 40 mg PO DAILY 30 Days #30 cap 12/04/22  Rx








                                    Allergies











Allergy/AdvReac Type Severity Reaction Status Date / Time


 


No Known Allergies Allergy   Verified 09/22/24 21:45














Physical Exam


Vitals: 


                                   Vital Signs











  Temp Pulse Pulse Resp BP BP Pulse Ox


 


 09/23/24 07:00  98.6 F   63  16   109/74  94 L


 


 09/23/24 02:50  98.4 F  61   17  109/66   97


 


 09/22/24 23:24   90   17  103/41   95


 


 09/22/24 22:15  103.3 F H      


 


 09/22/24 21:38  100.2 F H  98   17  108/95   97








                                Intake and Output











 09/22/24 09/23/24 09/23/24





 22:59 06:59 14:59


 


Other:   


 


  Weight 90.265 kg 90.265 kg 














Results





                                 09/23/24 08:50





                                 09/23/24 08:50


                                 Cardiac Enzymes











  09/22/24 09/22/24 09/23/24 Range/Units





  22:11 22:11 08:50 


 


AST  168 H   97 H  (14-36)  U/L


 


Troponin I   <0.012   (0.000-0.034)  ng/mL








                                   Coagulation











  09/22/24 Range/Units





  22:11 


 


PT  13.0 H  (10.0-12.5)  sec


 


APTT  27.8  (22.0-30.0)  sec








                                       CBC











  09/22/24 09/23/24 Range/Units





  22:11 08:50 


 


WBC  9.9  6.4  (3.8-10.6)  k/uL


 


RBC  4.10  4.10  (3.80-5.40)  m/uL


 


Hgb  12.8  12.6  (11.4-16.0)  gm/dL


 


Hct  38.2  39.1  (34.0-46.0)  %


 


Plt Count  202  183  (150-450)  k/uL








                          Comprehensive Metabolic Panel











  09/22/24 09/23/24 Range/Units





  22:11 08:50 


 


Sodium  134 L  133 L  (137-145)  mmol/L


 


Potassium  4.0  3.9  (3.5-5.1)  mmol/L


 


Chloride  99  105  ()  mmol/L


 


Carbon Dioxide  28  27  (22-30)  mmol/L


 


BUN  19 H  16  (7-17)  mg/dL


 


Creatinine  0.78  0.66  (0.52-1.04)  mg/dL


 


Glucose  118 H  88  (74-99)  mg/dL


 


Calcium  8.7  8.4  (8.4-10.2)  mg/dL


 


AST  168 H  97 H  (14-36)  U/L


 


ALT  63 H  53 H  (4-34)  U/L


 


Alkaline Phosphatase  178 H  147 H  ()  U/L


 


Total Protein  7.5  6.8  (6.3-8.2)  g/dL


 


Albumin  3.8  3.4 L  (3.5-5.0)  g/dL








                               Current Medications











Generic Name Dose Route Start Last Admin





  Trade Name Freq  PRN Reason Stop Dose Admin


 


Acetaminophen  650 mg  09/23/24 02:04 





  Acetaminophen Tab 325 Mg Tab  PO  





  Q6HR PRN  





  Mild Pain or Fever > 100.5  


 


Al Hydroxide/Mg Hydroxide  15 ml  09/23/24 02:04 





  Mag Hydrox/Al Hydrox/Simeth 30 Ml Cup  PO  





  Q6HR PRN  





  Indigestion  


 


Calcium Carbonate/Glycine  1,000 mg  09/23/24 02:04 





  Calcium Carbonate 500 Mg Chewable  PO  





  Q4HR PRN  





  Dyspepsia  


 


Dextrose/Sodium Chloride  1,000 mls @ 20 mls/hr  09/23/24 02:15  09/23/24 04:16





  Dextrose 5%-1/2ns Iv Soln  IV   20 mls/hr





  .Q24H CHRIS   Administration


 


Ampicillin Sodium/Sulbactam  100 mls @ 200 mls/hr  09/23/24 06:15  09/23/24 

07:02





  Sodium 3 gm/ Sodium Chloride  IVPB   200 mls/hr





  Q6HR CHRIS   Administration





  Protocol  


 


Morphine Sulfate  4 mg  09/23/24 02:04 





  Morphine Sulfate 4 Mg/Ml Syringe  IV  





  Q4HR PRN  





  Severe Pain (Scale 7 to 10)  


 


Naloxone HCl  0.2 mg  09/23/24 02:04 





  Naloxone 0.4 Mg/Ml 1 Ml Vial  IV  





  Q2M PRN  





  Opioid Reversal  


 


Ondansetron HCl  4 mg  09/23/24 02:04 





  Ondansetron 4 Mg/2 Ml Vial  IVP  





  Q8HR PRN  





  Nausea And Vomiting  


 


Pantoprazole Sodium  40 mg  09/23/24 09:00  09/23/24 08:33





  Pantoprazole 40 Mg/10 Ml Vial  IV   40 mg





  DAILY CHRIS   Administration


 


Tramadol HCl  50 mg  09/23/24 02:04 





  Tramadol 50 Mg Tab  PO  





  Q6H PRN  





  Moderate Pain (Scale 4 to 6)  








                                Intake and Output











 09/22/24 09/23/24 09/23/24





 22:59 06:59 14:59


 


Other:   


 


  Weight 90.265 kg 90.265 kg 








                                        





                                 09/23/24 08:50 





                                 09/23/24 08:50

## 2024-09-23 NOTE — P.HPADDEND
H&P Addendum


H&P Addendum Date: 09/23/24





Additional history obtained from patient.  She reports 3-week history of chronic

cough including mild ache of the right upper quadrant.  She had been on 

different medications for her hypertension including switching from lisinopril 

for chronic cough.  Patient is now diagnosed with active COVID disease.  She is 

on blood thinner Pradaxa which her last dose was 3 days ago, Friday morning.  

Additionally, ultrasound independently reviewed demonstrates thickened 

gallbladder wall consistent with acute cholecystitis.  Overall, cardiac risk 

assessment obtained for atrial fibrillation rapid ventricular response.  Patient

is at elevated risk for complications due to anticoagulant, atrial fibrillation,

acute cholecystitis longstanding.

## 2024-09-23 NOTE — P.OP
Date of Procedure: 09/23/24


Description of Procedure: 








SURGEON:  CALUDIO WELDON MD





PREOPERATIVE DIAGNOSES:  


1.  Acute cholecystitis


2.  Symptomatic gallstones


3.  Hypertensive heart disease


4.  Atrial fibrillation


5.  Pulmonary hypertension


6.  Obesity due to excess calories, BMI 34.2





POSTOPERATIVE DIAGNOSES:  


1.  Acute hemorrhagic gangrenous cholecystitis with cystic duct obstruction due 

to gallstones


2.  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

30 cc


3.  Hypertensive heart disease


4.  Atrial fibrillation


5.  Pulmonary hypertension


6.  Obesity due to excess calories, BMI 34.2





OPERATION:       


1.  Robotic-assisted da Angel Xi laparoscopic lysis of adhesions over 3 hours


2.  Robotic-assisted da Angel Xi laparoscopic cholecystectomy, multiport with 

FIREFLY


3.  Placement of round #19 Vinnie-Ellsworth drain, hepatic fossa


 


ESTIMATED BLOOD LOSS: 100 mL.


SPECIMENS REMOVED:  Gallbladder.


COMPLICATIONS:  None.





OPERATIVE FINDINGS:  


1.  Acute gangrenous cholecystitis with hydrops including gallbladder to 

abdominal wall cutaneous fistula with abscess


2.  Indocyanine green confirms acute cholecystitis with lack of contrast in 

gallbladder


3.  Severe right upper quadrant adhesions including pericholecystic adhesions 

requiring extensive dissection with 3 hours





INDICATIONS: The patient is a 74 year-old female who presents with epigastric 

right upper quadrant pain, symptomatic gallstones, clinical features of acute 

cholecystitis.  Antibiotic management including pain management was prescribed 

to manage her cholecystitis.  Cardiac risk assessment was performed prior to 

surgery.  Surgical intervention with cholecystectomy was described.  Robotic 

assisted laparoscopic approach was described. Benefits and risks of the 

procedure including but not limited to bleeding, infection, injury to the 

biliary tree was reviewed. Informed consent was obtained.  








DESCRIPTION OF PROCEDURE: Patient was brought to the operating room, 


placed in supine position. After general induction, the abdomen had 


been prepped and draped in standard sterile fashion. The robotic da Angel 


XI system was primed.  





After a timeout protocol was performed, the patient had been prepped 


and draped in standard sterile fashion.





The patient was injected with indocyanine green.





A 5 mm 0 degrees laparoscopic trocar entry was performed along the left upper 

quadrant.  The abdomen insufflated to 15 mmHg pressure which was tolerated well.

Diagnostic laparoscopy demonstrated no injury to bowel viscera or mesentery.  

The liver surface was unremarkable.  A moderately distended gallbladder was 

identified adding complexity to the case. Next, two 8 mm robotic ports were 

placed along the right upper abdomen. The camera 8-mm port was maintained along 

the epigastrium.  Another 8 mm port was placed along the left upper abdominal 

wall after exchanging the 5 mm port. Please note that the ports were placed at 

least 10 to 15 cm away from the target anatomy of the gallbladder. 





The robot was docked along the left lateral abdomen. 


The patient was repositioned in reverse Trendelenburg position at 21 with the 

right side up 70.





Using a grasper for arm 3, a grasper for arm 4, including hook cautery for arm 

1, the robotic system was docked and primed as described.  Instruments were 

interchanged by the assistant including hook cautery, Bovie cautery and clip 

appliers.  Additional instruments including vessel sealer, robotic suction 

, robotic stapler were made available.





I had sat at the console. 





The gallbladder was encased in surrounding tissue including the proximal 

transverse colon, omentum adding complexity to the case and requiring extensive 

lysis of adhesions using blunt and sharp dissection using vessel sealer 

including hook artery for over one hour.  The gallbladder was reflected towards 

the dome of the liver.  The gallbladder was moderately distended adding 

complexity to the case.  Edema was found along the cystic triangle including 

infundibulum.  Initial dissection was performed on the gallbladder infundibulum 

using indocyanine green to illuminate the cystic duct and common bile duct.  Due

to moderate distention of the infundibulum, dome down technique was performed 

removing the gallbladder from the hepatic fossa starting from the fundus towards

the infundibulum.





Using a sponge, the liver was reflected towards the diaphragm and starting at 

the gallbladder fundus, hook cautery was used between the liver and the 

gallbladder.  The patient pre-existing easy bleeding from her skin incisions and

from the liver bed requiring hemostatic agents such as fibular.  As the 

gallbladder was dissected from the hepatic fossa, hemostasis was checked using 

vessel sealer along the posterior gallbladder.  Next, indocyanine green was used

to confirm the common bile duct as well as cystic duct.  The entire gallbladder 

was without contrast consistent with acute cholecystitis.  





The infundibulum was retracted laterally away from the common bile duct.  The 

left upper quadrant trocar was exchanged for a 12 mm robotic trocar.





FIREFLY was used to identify the common bile duct.  Robotic 45 mm green staple 

loads were fired across the infundibulum as the cystic duct and cystic 

structures were moderately edematous.  Additionally, gallstone was impacted 

along the neck of the gallbladder.


Bleeding along the liver bed was controlled and hemostatic agents.





The abdomen was irrigated with normal saline solution of 1 L.  Indocyanine green

was used to confirm no bile leak from the staple line.  Sponges were removed 

from the abdomen.





The robot was undocked.





I re-scrubbed into the case.





A 10 mm Endo Catch bag was used to remove the gallbladder in total via the left 

upper quadrant incision after widening the incision.  The specimen was removed 

from the abdominal cavity.  Jose Sanders and 0 Vicryl was used to close the 

fascial defect of the left upper quadrant. 





A round #19 drain was placed along the right upper quadrant at the hepatic fossa

and exited via the right lateral trocar.  A drain stitch 2-0 nylon was placed.





All pneumoperitoneum instruments were evacuated from the abdominal cavity.  The 

incisions were cleansed using dilute hydrogen peroxide.  The incisions were 

reapproximated using 4-0 Monocryl in an interrupted subcuticular fashion.  

Please note along the trocar sites, local anesthetic was placed as a field block




prior to insertion of all instruments.  





Liquid glue was applied to the skin. Optifoam was placed along the FADI site for 

the left upper quadrant.





At the end of the procedure needle, sponge, and instrument count had 


been verified correct by the surgical technician. The patient was 


transferred to postanesthesia care unit in stable condition.





Intraoperative films were shared with the patient's family who were pleased with

the level of care.

## 2024-09-24 LAB
ALBUMIN SERPL-MCNC: 3.3 G/DL (ref 3.8–4.9)
ALBUMIN/GLOB SERPL: 1.03 RATIO (ref 1.6–3.17)
ALP SERPL-CCNC: 116 U/L (ref 41–126)
ALT SERPL-CCNC: 59 U/L (ref 8–44)
ANION GAP SERPL CALC-SCNC: 10.6 MMOL/L (ref 4–12)
AST SERPL-CCNC: 133 U/L (ref 13–35)
BASOPHILS # BLD AUTO: 0.01 X 10*3/UL (ref 0–0.1)
BASOPHILS NFR BLD AUTO: 0.1 %
BUN SERPL-SCNC: 16.8 MG/DL (ref 9–27)
BUN/CREAT SERPL: 18.67 RATIO (ref 12–20)
CALCIUM SPEC-MCNC: 7.8 MG/DL (ref 8.7–10.3)
CHLORIDE SERPL-SCNC: 100 MMOL/L (ref 96–109)
CO2 SERPL-SCNC: 24.4 MMOL/L (ref 21.6–31.8)
EOSINOPHIL # BLD AUTO: 0 X 10*3/UL (ref 0.04–0.35)
EOSINOPHIL NFR BLD AUTO: 0 %
ERYTHROCYTE [DISTWIDTH] IN BLOOD BY AUTOMATED COUNT: 4.1 X 10*6/UL (ref 4.1–5.2)
ERYTHROCYTE [DISTWIDTH] IN BLOOD: 13.4 % (ref 11.5–14.5)
GLOBULIN SER CALC-MCNC: 3.2 G/DL (ref 1.6–3.3)
GLUCOSE SERPL-MCNC: 146 MG/DL (ref 70–110)
HCT VFR BLD AUTO: 38.6 % (ref 37.2–46.3)
HGB BLD-MCNC: 12.5 G/DL (ref 12–15)
IMM GRANULOCYTES BLD QL AUTO: 0.3 %
INR PPP: 1.2 (ref ?–1.2)
LIPASE SERPL-CCNC: 16 U/L (ref 14–63)
LYMPHOCYTES # SPEC AUTO: 0.99 X 10*3/UL (ref 0.9–5)
LYMPHOCYTES NFR SPEC AUTO: 11.5 %
MCH RBC QN AUTO: 30.5 PG (ref 27–32)
MCHC RBC AUTO-ENTMCNC: 32.4 G/DL (ref 32–37)
MCV RBC AUTO: 94.1 FL (ref 80–97)
MONOCYTES # BLD AUTO: 0.67 X 10*3/UL (ref 0.2–1)
MONOCYTES NFR BLD AUTO: 7.8 %
NEUTROPHILS # BLD AUTO: 6.94 X 10*3/UL (ref 1.8–7.7)
NEUTROPHILS NFR BLD AUTO: 80.3 %
NRBC BLD AUTO-RTO: 0 X 10*3/UL (ref 0–0.01)
PLATELET # BLD AUTO: 181 X 10*3/UL (ref 140–440)
POTASSIUM SERPL-SCNC: 4.3 MMOL/L (ref 3.5–5.5)
PROT SERPL-MCNC: 6.5 G/DL (ref 6.2–8.2)
PT BLD: 13 SEC (ref 10–12.5)
SODIUM SERPL-SCNC: 135 MMOL/L (ref 135–145)
WBC # BLD AUTO: 8.64 X 10*3/UL (ref 4.5–10)

## 2024-09-24 RX ADMIN — SIMETHICONE SCH MG: 20 SUSPENSION/ DROPS ORAL at 22:03

## 2024-09-24 RX ADMIN — ACETAMINOPHEN PRN MG: 325 TABLET, FILM COATED ORAL at 22:01

## 2024-09-24 RX ADMIN — MAGNESIUM SULFATE IN DEXTROSE SCH MLS/HR: 10 INJECTION, SOLUTION INTRAVENOUS at 21:03

## 2024-09-24 RX ADMIN — HEPARIN SODIUM SCH UNIT: 5000 INJECTION, SOLUTION INTRAVENOUS; SUBCUTANEOUS at 08:26

## 2024-09-24 RX ADMIN — PIPERACILLIN AND TAZOBACTAM SCH MLS/HR: 3; .375 INJECTION, POWDER, FOR SOLUTION INTRAVENOUS at 00:06

## 2024-09-24 RX ADMIN — MORPHINE SULFATE PRN MG: 4 INJECTION, SOLUTION INTRAMUSCULAR; INTRAVENOUS at 00:06

## 2024-09-24 RX ADMIN — HYDROMORPHONE HYDROCHLORIDE PRN MG: 1 INJECTION, SOLUTION INTRAMUSCULAR; INTRAVENOUS; SUBCUTANEOUS at 03:37

## 2024-09-24 RX ADMIN — CALCIUM CARBONATE (ANTACID) CHEW TAB 500 MG PRN MG: 500 CHEW TAB at 15:24

## 2024-09-24 RX ADMIN — ACETAMINOPHEN PRN ML: 160 SOLUTION ORAL at 00:15

## 2024-09-24 RX ADMIN — DEXAMETHASONE SODIUM PHOSPHATE SCH MG: 4 INJECTION, SOLUTION INTRAMUSCULAR; INTRAVENOUS at 23:55

## 2024-09-24 RX ADMIN — CEFAZOLIN ONE MLS/HR: 330 INJECTION, POWDER, FOR SOLUTION INTRAMUSCULAR; INTRAVENOUS at 20:50

## 2024-09-24 RX ADMIN — INDOCYANINE GREEN STA: KIT INTRAVENOUS at 04:04

## 2024-09-24 RX ADMIN — ONDANSETRON SCH MG: 2 INJECTION INTRAMUSCULAR; INTRAVENOUS at 23:55

## 2024-09-24 NOTE — P.CONS
Chief Complaint   Patient presents with   â¢ Chest Pain Adult     History Of Present Illness  Lady Slater is a 45year old female presenting with Patient is a 45years old female with past medical history of anxiety who does not take any medicine at home came into the emergency room after the patient reportedly waking up at 10:30 AM yesterday with a complaint of sharp pleuritic right upper chest pain radiating to her back. Patient also reports significant cough. Patient's COVID test was negative. She denied any calf pain. Imaging was significant for pneumonia. Patient was admitted and was started on antibiotics. Patient was noted to be tachycardic. Past Medical History  Past Medical History:   Diagnosis Date   â¢ Anemia    â¢ Anxiety    â¢ Depression    â¢ No pertinent past medical history         Surgical History  Past Surgical History:   Procedure Laterality Date   â¢ No past surgeries          Social History  Social History     Tobacco Use   â¢ Smoking status: Current Every Day Smoker   â¢ Smokeless tobacco: Never Used   Substance Use Topics   â¢ Alcohol use: Never   â¢ Drug use: Never       Family History  No family history on file. Allergies  ALLERGIES:  Patient has no known allergies. Medications  No medications prior to admission. Review of Systems  Review of Systems   Constitutional: Negative. Negative for chills and fever. HENT: Negative. Negative for congestion, ear pain, hearing loss, sore throat and tinnitus. Eyes: Negative. Negative for blurred vision, double vision and photophobia. Respiratory: Positive for cough and shortness of breath. Negative for hemoptysis, sputum production and wheezing. Cardiovascular: Positive for chest pain. Negative for palpitations, orthopnea, claudication and leg swelling. Gastrointestinal: Negative. Negative for abdominal pain, diarrhea, heartburn, nausea and vomiting. Genitourinary: Negative.   Negative for dysuria, frequency, hematuria and History of Present Illness





- Reason for Consult


Consult date: 09/24/24


Abscess form gallbladder/cholecystitis


Requesting physician: Sue Martinez





- Chief Complaint


Weakness and fall x 2 days





- History of Present Illness





Patient is a 74-year-old  female with a past medical history 

significant for atrial fibrillation hypertension osteoarthritis presenting to 

the hospital 2 days ago after apparently the patient did have a fall patient 

mentioned that right leg became weak causing her to fall she fell towards the 

side denies any loss of consciousness patient on presentation to the hospital 

was noted to be febrile with a temperature of 103.3 F patient was not 

tachycardic or hypertensive not hypoxic or need for supplemental oxygen she did 

have a white count of 9.9 creatinine was normal liver enzymes are elevated urine

was negative he did tested positive for COVID influenza RSV was negative patient

did have a chest x-ray mild cardiomegaly without acute pulmonary infiltrate 

patient did have gallbladder ultrasound large stone seen in terminal diabetes 

with wall thickening patient has been evaluated by general surgery she was taken

to the OR last night and the patient is status post robotic assisted 

laparoscopic lysis of adhesion cholecystectomy and placement of a drain patient 

has been started on Zosyn was initially on Rocephin infectious disease was 

consulted for further management of antibiotic therapy





Review of Systems





Positive point and negatives has been  mentioned in the HPI, complete review of 

systems was performed and all other systems are negative





Past Medical History


Past Medical History: Atrial Fibrillation, Hypertension, Osteoarthritis (OA), 

Vascular Disorder


Additional Past Medical History / Comment(s): ankle swelling, environmental 

allergies-gets allergy shots, vericose veins


History of Any Multi-Drug Resistant Organisms: None Reported


Additional Past Surgical History / Comment(s): colonoscopy, TLK, LTK


Past Psychological History: No Psychological Hx Reported


Smoking Status: Never smoker


Past Alcohol Use History: None Reported


Past Drug Use History: None Reported





Medications and Allergies


                                Home Medications











 Medication  Instructions  Recorded  Confirmed  Type


 


Metoprolol Succinate (ER) [Toprol 25 mg PO DAILY 11/30/22 09/23/24 History





XL]    


 


hydroCHLOROthiazide [Hydrodiuril] 12.5 mg PO DAILY 11/30/22 09/23/24 History


 


Dabigatran Etexilate Mesylate 150 mg PO BID 09/23/24 09/23/24 History





[Dabigatran Etexilate]    


 


Irbesartan 150 mg PO DAILY 09/23/24 09/23/24 History


 


Loratadine [Claritin] 10 mg PO DAILY 09/23/24 09/23/24 History








                                    Allergies











Allergy/AdvReac Type Severity Reaction Status Date / Time


 


lisinopril AdvReac  Cough Verified 09/23/24 10:38














Physical Exam


Vitals: 


                                   Vital Signs











  Temp Pulse Resp BP BP Pulse Ox


 


 09/24/24 07:35  98.9 F  69  17  107/70   90 L


 


 09/24/24 02:05   70    134/81  94 L


 


 09/24/24 01:25   72    146/80  94 L


 


 09/24/24 00:15   73    122/78  95


 


 09/23/24 23:45   68    134/85  97


 


 09/23/24 23:15   77    131/75  94 L


 


 09/23/24 23:00   79    123/76  93 L


 


 09/23/24 22:45   85    126/77  90 L


 


 09/23/24 22:30   73    121/77  92 L


 


 09/23/24 22:15  98.4 F  72    104/65  93 L


 


 09/23/24 15:00  99.9 F H  58 L  16   108/63  94 L








                                Intake and Output











 09/23/24 09/24/24 09/24/24





 22:59 06:59 14:59


 


Intake Total 550  0


 


Output Total 100 150 


 


Balance 450 -150 0


 


Intake:   


 


    


 


  Oral   0


 


Output:   


 


  Urine  150 


 


  Estimated Blood Loss 100  


 


Other:   


 


  Voiding Method Indwelling Catheter  Toilet














GENERAL DESCRIPTION: Elderly female lying in bed, no distress. No tachypnea or 

accessory muscle of respiration use.


HEENT: Shows Pallor , no scleral icterus. Oral mucous membrane is dry. No 

pharyngeal erythema or thrush


NECK: Trachea central, no thyromegaly.


LUNGS: Unlabored breathing. Clear to auscultation anteriorly. No wheeze or 

crackle.


HEART: S1, S2, regular rate and rhythm. No loud murmur


ABDOMEN: Soft, mild right-sided tenderness


EXTREMITIES: No edema of feet.


SKIN: No rash, no masses palpable.


NEUROLOGICAL: The patient is awake, alert, oriented x3, mood and affect normal.














Results


CBC & Chem 7: 


                                 09/24/24 05:31





                                 09/24/24 05:31


Labs: 


                  Abnormal Lab Results - Last 24 Hours (Table)











  09/24/24 09/24/24 09/24/24 Range/Units





  05:31 05:31 05:31 


 


Eosinophils #  0 L    (0.04-0.35)  X 10*3/uL


 


PT   13.0 H   (10.0-12.5)  sec


 


INR   1.2 H   (<1.2)  


 


Glucose    146 H  ()  mg/dL


 


Calcium    7.8 L  (8.7-10.3)  mg/dL


 


AST    133 H  (13-35)  U/L


 


ALT    59 H  (8-44)  U/L


 


Albumin    3.3 L  (3.8-4.9)  g/dL


 


Albumin/Globulin Ratio    1.03 L  (1.60-3.17)  Ratio














Assessment and Plan


(1) COVID-19


Current Visit: Yes   Status: Acute   Code(s): U07.1 - COVID-19   SNOMED Code(s):

916577467


   





(2) Gangrenous cholecystitis


Current Visit: Yes   Status: Acute   Code(s): K81.0 - ACUTE CHOLECYSTITIS   

SNOMED Code(s): 33391085


   


Plan: 





1patient presented to hospital with a fall patient was noticed to be febrile 

with evidence of gangrenous cholecystitis in this patient was status post 

laparoscopic ostectomy and drainage of the abscess with abdominal cultures 

currently pending we will need to cover for the enteric gram-negative to be the 

likely pathogen.


2patient did tested positive for COVID-19 which may be incidental finding as 

she did not have significant respiratory symptoms chest x-ray did not show any 

acute infiltrate may consider discontinuation of dexamethasone keeping in mind 

acute infection with cholecystitis


3-patient is broadly covered with the Zosyn to continue while waiting for the 

culture to finalize


We will follow on clinical condition and cultures to further adjust medication 

if needed


Thank you for this consultation we will follow the patient along with you


Dictation was produced using dragon dictation software. please excuse any 

grammatical, word or spelling errors. 








Time with Patient: Greater than 30 "urgency. Musculoskeletal: Negative. Skin: Negative. Negative for itching and rash. Neurological: Negative for dizziness, tingling, focal weakness, weakness and headaches. Psychiatric/Behavioral: Negative. Last Recorded Vitals  Blood pressure 118/77, pulse (!) 103, temperature 99 Â°F (37.2 Â°C), temperature source Oral, resp. rate 20, height 5' 1"" (1.549 m), weight 60.5 kg (133 lb 6.1 oz), last menstrual period 04/30/2022, SpO2 100 %. Physical Exam  Vitals and nursing note reviewed. Constitutional:       Appearance: Normal appearance. She is normal weight. HENT:      Head: Normocephalic and atraumatic. Right Ear: External ear normal.      Left Ear: External ear normal.      Nose: Nose normal. No congestion or rhinorrhea. Mouth/Throat:      Mouth: Mucous membranes are moist.      Neck: Neck supple. No muscular tenderness. Eyes:      General: No scleral icterus. Right eye: No discharge. Left eye: No discharge. Extraocular Movements: Extraocular movements intact. Conjunctiva/sclera: Conjunctivae normal.      Pupils: Pupils are equal, round, and reactive to light. Cardiovascular:      Rate and Rhythm: Normal rate and regular rhythm. Pulses: Normal pulses. Heart sounds: Normal heart sounds. No murmur heard. No gallop. Pulmonary:      Effort: Pulmonary effort is normal. No respiratory distress. Breath sounds: No wheezing, rhonchi or rales. Comments: Decreased breath sounds on the right side  Chest:      Chest wall: No tenderness. Abdominal:      General: Abdomen is flat. Bowel sounds are normal. There is no distension. Palpations: Abdomen is soft. There is no mass. Tenderness: There is no abdominal tenderness. There is no guarding or rebound. Hernia: No hernia is present. Musculoskeletal:         General: No tenderness or signs of injury. Normal range of motion. Right lower leg: No edema.       Left lower leg: No " edema.   Skin:     General: Skin is warm. Coloration: Skin is not jaundiced or pale. Findings: No bruising, erythema, lesion or rash. Neurological:      General: No focal deficit present. Mental Status: She is alert and oriented to person, place, and time. Cranial Nerves: No cranial nerve deficit. Sensory: No sensory deficit. Motor: No weakness. Coordination: Coordination normal.      Gait: Gait normal.   Psychiatric:         Mood and Affect: Mood normal.         Behavior: Behavior normal.         Thought Content: Thought content normal.         Judgment: Judgment normal.          Imaging      Labs       Assessment & Plan    * Sepsis due to pneumonia (CMS/HCC)  Assessment & Plan  Antibiotics started in the ER. Infectious disease consultation. Respiratory status currently stable. Continue IV antibiotics. Robitussin with codeine for cough. Discussed with the patient. Iron deficiency anemia due to chronic blood loss  Assessment & Plan  Hemoglobin was 6.9 this morning. Iron studies reviewed. Patient with low iron. 1 unit transfusion ordered      Tachycardia  Assessment & Plan  IV hydration. Due to the above. Monitor closely. Keep on telemetry.       Smoking Cessation  Ready to quit: Not Answered  Counseling given: Not Answered      DVT Prophylaxis    Current Active Medications for DVT Prophylaxis (From admission, onward)         Stop     enoxaparin (LOVENOX) injection 40 mg  40 mg,   Subcutaneous,   DAILY         --                Code Status    Code Status: Full Resuscitation    Primary Care Physician  Verify Pcp

## 2024-09-24 NOTE — P.PN
Subjective


Progress Note Date: 09/24/24


74-year-old woman who presented to the emergency department with history of 

fall, 3 weeks of persistent cough and 3 weeks of right upper quadrant abdominal 

pain.  She has a past medical history significant for atrial fibrillation, 

hypertension and osteoarthritis.  Patient states that she was at home and felt 

her right leg become weak causing her to fall.  States she fell towards her side

and then onto her back on hardwood floor.  Denies any loss of consciousness or 

hitting her head, however her  told her she did hit her head against the 

door.  Patient denies neck pain, back pain, numbness or weakness in her 

extremities, chest pain, shortness of breath or difficulty in breathing, nausea,

vomiting, diarrhea, dysuria, increased urinary frequency.  Per the EMS, 

patient's temperature was 101.6, on arrival to the emergency department was 

100.2 and the patient states she took no antipyretics prior to arrival in the 

emergency department.  Rectal temperature done in the emergency department jamaal

wed a temperature of 103.3.  Patient states that she has had some fevers and 

chills for the last week, and states she had a cough for the last 3 weeks that 

her doctor thought was secondary to her lisinopril use so changed her 

medication, she states the cough is still present.  Notes that the cough is 

nonproductive and there is no blood or sputum.  She does states she has some 

right-sided rib/abdominal pain which she suspected was from coughing, she states

her primary care without the same.  However following resolution to the 

emergency department and patient's abdominal tenderness in the right upper 

quadrant and imaging studies patient was shown to have acute cholecystitis.  

Surgery has been consulted, patient being held n.p.o. in preparation for surgery

later today (9/23).


 


Initial lab work done in the ER showed WBCs 9.9, Hgb 12.8, Hct 38.2, , PT

13.0, INR 1.2, PTT 27.8, sodium 134, potassium 4.0, BUN 19, creatinine 0.70, 

magnesium 1.5, , ALT 63, alkaline phosphatase 178


Urinalysis showed cloudy appearance, 1+ protein, 1+ ketones, small amount of 

blood, rare bacteria, moderate urine mucus, rare yeast





COVID-19 positive





9/24/24 - Patient is post-op day 1.  Patient states she is feeling well, better 

than she was yesterday.  States that she has some abdominal pain which she rates

as a 4/10, however denies nausea, vomiting, fevers, chills and she states that 

she is no longer coughing today.  Per the operative report patient was found to 

have an acute gangrenous cholecystitis with hydrops of the gallbladder to the 

abdominal wall cutaneous fistula with abscess, severe right upper quadrant 

adhesions including pericholecystic adhesions requiring extensive dissections.  

Vital signs of this morning show blood pressure 107/70, heart rate of 69, 

respiratory rate 17, oxygen saturation 90% on room air.  Patient was started on 

Zosyn, will continue the Flagyl however discontinued Rocephin.  Infectious 

disease consulted for their recommendations regarding antibiotic course.





New labs - WBCs 8.64, Hgb 12.5,Hct 30.6, ; PT 13, INR 1.2; sodium 135, 

potassium 4.3, BUN 16.8, creatinine 0.9, , ALT 59





REVIEW OF SYSTEMS: 


CONSTITUTIONAL: Fevers, chills, weakness


HEENT: No recent visual problems or hearing problems. Denied any sore throat. 


CARDIOVASCULAR: No chest pain, orthopnea, PND, no palpitations, no syncope. 


PULMONARY: No shortness of breath, no hemoptysis.  Reports cough.


GASTROINTESTINAL: No diarrhea.  Has had nausea and vomiting, last vomit on 

Saturday (9/21).  Reports abdominal pain.


NEUROLOGICAL: No headaches, no weakness, no numbness. 


HEMATOLOGICAL: Denies any bleeding or petechiae. 


GENITOURINARY: Denies any burning micturition, frequency, or urgency. 


MUSCULOSKELETAL/RHEUMATOLOGICAL: Denies any joint pain, swelling, or any muscle 

pain. 


ENDOCRINE: Denies any polyuria or polydipsia. 


 


The rest of the 14-point review of systems is negative.


 


PHYSICAL EXAMINATION: 


 


GENERAL: The patient is alert and oriented x3, not in any acute distress. Well 

developed, well nourished. 


HEENT: Pupils are round and equally reacting to light. EOMI. No scleral icterus.

No conjunctival pallor. Normocephalic, atraumatic. No pharyngeal erythema. No 

thyromegaly. 


CARDIOVASCULAR: S1 and S2 present. No murmurs, rubs, or gallops.


NECK: C-collar in place, no midline spinal tenderness to palpation


PULMONARY: Chest is clear to auscultation, no wheezing or crackles. 


ABDOMEN: Soft, and nondistended.  Tenderness to the right upper quadrant.


MUSCULOSKELETAL: No joint swelling or deformity.


EXTREMITIES: No cyanosis, clubbing, or pedal edema. 


NEUROLOGICAL: Gross neurological examination did not reveal any focal deficits. 


SKIN: No rashes. 





Assessment and plan





# Acute cholecystitis


 Patient presented with right-sided abdominal pain, which she thought was 

secondary to coughing


 Patient presented with a fever, without elevated white count


 Gallbladder ultrasound completed which was determined to be a suboptimal study

however sonographic findings are concerning for acute cholecystitis; surgery was

consulted


 Patient given 2 g cefepime, 500 mg Flagyl each once


 Patient given 3 g Unasyn every 6 hours per surgery recommendation; switch to 

Rocephin and Flagyl


 Patient has tramadol every 6 hours and morphine 4 mg every 4 hours for pain 

control


 Patient made n.p.o. and preparation for surgery today (9/23)


 Acute gangrenous cholecystitis with hydrops of the gallbladder to the 

abdominal wall cutaneous fistula with abscess, severe right upper quadrant 

adhesions including pericholecystic adhesions requiring extensive dissections


 Discontinue Rocephin; patient started on Zosyn and will continue Flagyl





# Mild COVID-19 infection


 Patient tested positive for COVID-19


 On arrival patient was saturating 97% on room air, currently saturating 94% on

room air


 Patient has had persistent cough for 3 weeks; 3-week history of fever, 

uncertain origin COVID vs cholecystitis





# History of atrial fibrillation


 Patient has a history of atrial fibrillation


 Noted on EKG in the emergency department atrial fibrillation with RVR


 Patient regularly takes Pradaxa; currently may hold - last dose taken Friday 

per the patient





GI prophylaxis: Protonix 40 mg daily


 


 


Continue to monitor vital signs, monitor CBC, monitor CMP, continue telemetry 

monitoring


Labs and medication were reviewed.  Continue with symptomatic treatment. 








Dictation was produced using dragon dictation software. please excuse any 

grammatical, word or spelling errors. 





Attestation: I have personally seen and examined the patient with Resident, 

reviewed the documentation and participated and agree with the assessment and 

plan as written. 


Saran King MD





Objective





- Vital Signs


Vital signs: 


                                   Vital Signs











Temp  98.9 F   09/24/24 07:35


 


Pulse  69   09/24/24 07:35


 


Resp  17   09/24/24 07:35


 


BP  107/70   09/24/24 07:35


 


Pulse Ox  90 L  09/24/24 07:35


 


FiO2      








                                 Intake & Output











 09/23/24 09/24/24 09/24/24





 18:59 06:59 18:59


 


Intake Total 1000  


 


Output Total  250 


 


Balance 1000 -250 


 


Intake:   


 


    


 


  Intake, IV Titration 450  





  Amount   


 


    Ampicillin-Sulbactam 3 gm 100  





    In Sodium Chloride 0.9%   





    100 ml @ 200 mls/hr IVPB   





    Q6HR [facility] Rx#:[id number]   


 


    Dextrose 5%-0.45% NaCl 1, 300  





    000 ml @ 75 mls/hr IV .   





    [id number] [facility] Rx#:[id number]   


 


    cefTRIAXone 2 gm In 50  





    Sodium Chloride 0.9% 50   





    ml @ 100 mls/hr IVPB   





    Q24HR [facility] Rx#:[id number]   


 


Output:   


 


  Urine  150 


 


  Estimated Blood Loss  100 


 


Other:   


 


  Voiding Method  Indwelling Catheter 














- Labs


CBC & Chem 7: 


                                 09/27/24 05:57





                                 09/27/24 05:57


Labs: 


                  Abnormal Lab Results - Last 24 Hours (Table)











  09/23/24 09/24/24 Range/Units





  08:50 05:31 


 


PT   13.0 H  (10.0-12.5)  sec


 


INR   1.2 H  (<1.2)  


 


Sodium  133 L   (137-145)  mmol/L


 


AST  97 H   (14-36)  U/L


 


ALT  53 H   (4-34)  U/L


 


Alkaline Phosphatase  147 H   ()  U/L


 


Albumin  3.4 L   (3.5-5.0)  g/dL

## 2024-09-24 NOTE — P.PN
Subjective





HISTORY OF PRESENT ILLNESS:  





This is a 74-year-old female with a past medical history significant for 

persistent atrial fibrillation, hypertension, obstructive sleep apnea, and 

obesity. Patient follows in the office with Dr. Araujo. We have been asked to see

the patient in consultation for cardiac risk assessment. Patient examined at the

bedside.  Patient initially presented to the hospital secondary to a fall.  

Patient's fall was mechanical and there was no syncope noted.  Patient was noted

to be febrile.  She was tested for COVID and was found to be positive.  Patient 

was febrile overnight with a Tmax of 103.3.  The patient currently denies any 

chest pain or pressure.  She denies any shortness of breath.  She does report 

she has had a cough for the past couple weeks.  Vital signs are stable this 

morning.





DIAGNOSTICS:


- EKG reveals atrial fibrillation with RVR


- Chest xray mild cardiomegaly without acute pulmonary infiltrate


- Gallbladder ultrasound: Suboptimal study but sonographic findings are 

concerning for acute cholecystitis.  Large gallbladder stone seen with internal 

debris with wall thickening.


- Laboratory data: WBC 6.4.  Hemoglobin 12.6.  Platelet count 183.  Sodium 133. 

Potassium 3.9.  BUN 16.  Creatinine 0.66.  Bilirubin 0.7.  AST 97.  ALT 53.  TSH

2.150.


- Current home cardiac medication list has not been updated at the time of 

dictation


- Most recent echocardiogram obtained in December 2023 revealed EF 55%, mild to 

moderate mitral regurgitation, moderate tricuspid regurgitation, moderate 

pulmonary hypertension


- Patient underwent Lexiscan stress test in December 2021 which was negative for

ischemia


- Cardiac catheterization history: Patient denies





9/24/2024


Patient examined this morning at the bedside.  Patient is status post robotic 

assisted laparoscopic cholecystectomy with Dr. Martinez.  Postop day #1.  

Patient without complaints of chest pain or shortness of breath.  Vital signs 

are stable.  Echocardiogram completed revealing ejection fraction 55 to 60%, 

moderate severe pulmonary hypertension, mild MR, moderate TR.





PHYSICAL EXAM: 


VITAL SIGNS: Reviewed.


GENERAL: Well-developed in no acute distress. 


HEENT: Head is normocephalic. Pupils are equal, round. Sclerae anicteric. Mucous

membranes of the mouth are moist. Neck supple. No JVD or thyromegaly


LUNGS: Respirations even and unlabored. Lungs essentially clear to auscultation 

bilaterally.


HEART: Irregular rate and rhythm.  S1 and S2 heard.


ABDOMEN: Soft. Nondistended. Nontender.


EXTREMITIES: Normal range of motion.  No clubbing or cyanosis.  Peripheral 

pulses intact.  No lower extremity edema


NEUROLOGIC: Awake and alert. Oriented x 3. 





ASSESSMENT:


Status post mechanical fall without syncope


Acute COVID-19


Acute cholecystitis, s/p cholecystectomy


Elevated LFTs


Persistent atrial fibrillation, on Pradaxa outpatient


Hypertension


Obstructive sleep apnea with CPAP use


Obesity: BMI 34.2





PLAN: 


Resume Pradaxa when cleared by general surgery


Continue current cardiac medications


Continue telemetry monitoring


Further recommendations pending patient course


Patient to follow-up postdischarge with Dr. Araujo


We will sign off.  Please reconsult if needed.








Nurse practitioner note has been reviewed by physician. Signing provider agrees 

with the documented findings, assessment, and plan of care documented by NP as a

scribe.








Objective





- Vital Signs


Vital signs: 


                                   Vital Signs











Temp  98.9 F   09/24/24 07:35


 


Pulse  69   09/24/24 07:35


 


Resp  17   09/24/24 07:35


 


BP  107/70   09/24/24 07:35


 


Pulse Ox  90 L  09/24/24 07:35


 


FiO2      








                                 Intake & Output











 09/23/24 09/24/24 09/24/24





 18:59 06:59 18:59


 


Intake Total 1000  0


 


Output Total  250 


 


Balance 1000 -250 0


 


Intake:   


 


    


 


  Intake, IV Titration 450  





  Amount   


 


    Ampicillin-Sulbactam 3 gm 100  





    In Sodium Chloride 0.9%   





    100 ml @ 200 mls/hr IVPB   





    Q6HR [facility] Rx#:[id number]   


 


    Dextrose 5%-0.45% NaCl 1, 300  





    000 ml @ 75 mls/hr IV .   





    [id number] [facility] Rx#:[id number]   


 


    cefTRIAXone 2 gm In 50  





    Sodium Chloride 0.9% 50   





    ml @ 100 mls/hr IVPB   





    Q24HR [facility] Rx#:[id number]   


 


  Oral   0


 


Output:   


 


  Urine  150 


 


  Estimated Blood Loss  100 


 


Other:   


 


  Voiding Method  Indwelling Catheter Toilet














- Labs


CBC & Chem 7: 


                                 09/24/24 05:31





                                 09/24/24 05:31


Labs: 


                  Abnormal Lab Results - Last 24 Hours (Table)











  09/24/24 09/24/24 09/24/24 Range/Units





  05:31 05:31 05:31 


 


Eosinophils #  0 L    (0.04-0.35)  X 10*3/uL


 


PT   13.0 H   (10.0-12.5)  sec


 


INR   1.2 H   (<1.2)  


 


Glucose    146 H  ()  mg/dL


 


Calcium    7.8 L  (8.7-10.3)  mg/dL


 


AST    133 H  (13-35)  U/L


 


ALT    59 H  (8-44)  U/L


 


Albumin    3.3 L  (3.8-4.9)  g/dL


 


Albumin/Globulin Ratio    1.03 L  (1.60-3.17)  Ratio

## 2024-09-24 NOTE — P.PN
Subjective


Progress Note Date: 09/24/24





CHIEF COMPLAINT: Gangrenous cholecystitis





HISTORY OF PRESENT ILLNESS: Patient is postop day #1 status post robotic 

laparoscopic cholecystectomy and lysis of adhesions.  Patient reports her pain 

is controlled.  She denies any nausea or vomiting.  Afebrile.  White count 8.64 

total bilirubin 0.3 AST up at 133 ALT 59 alk phos 116





PHYSICAL EXAM: 


VITAL SIGNS: Reviewed


GENERAL: Well-developed in no acute distress. 


HEENT:  No sclera icterus. Extraocular movements grossly intact.  Moist buccal 

mucosa. 


Head is atraumatic, normocephalic. Hears conversational speech. No nasal 

drainage.


NECK:  Supple without lymphadenopathy.


CHEST:  Non-labored respirations and equal bilateral excursions. 


CARDIOVASCULAR:   Palpable 2+ radial pulses.


ABDOMEN:  Soft.  Nondistended.  Incision sites clean dry and intact.  FADI drain 

with sanguinous fluid


MUSCULOSKELETAL:  No clubbing or cyanosis.


NEUROLOGIC:  No focal or lateralizing signs.  Cranial nerves II through XII 

grossly intact.


PSYCH:  Appropriate affect.  Alert and oriented to person, place and time.


SKIN: Well perfused.  Good skin turgor. 





ASSESSMENT: 


1.  Acute hemorrhagic gangrenous cholecystitis with cystic duct obstruction due 

to gallstones


2.  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

30 cc


3.  Hypertensive heart disease


4.  Atrial fibrillation


5.  Pulmonary hypertension


6.  Obesity due to excess calories, BMI 34.2


7.  COVID-positive





PLAN: 


-Consult placed for infectious disease for antibiotic management


-Continue antibiotics


-Continue pain management


-Continue low-fat diet


-Continue to monitor FADI drain output


-Continue low-fat diet


-Encourage patient to increase activity level


-Encourage patient to use incentive spirometer


-DVT prophylaxis subcu heparin and GI prophylaxis Protonix





Physician Assistant note has been reviewed by physician. Signing provider agrees

with the documented findings, assessment, and plan of care.





Objective





- Vital Signs


Vital signs: 


                                   Vital Signs











Temp  98.9 F   09/24/24 07:35


 


Pulse  69   09/24/24 07:35


 


Resp  17   09/24/24 07:35


 


BP  107/70   09/24/24 07:35


 


Pulse Ox  90 L  09/24/24 07:35


 


FiO2      








                                 Intake & Output











 09/23/24 09/24/24 09/24/24





 18:59 06:59 18:59


 


Intake Total 1000  0


 


Output Total  250 


 


Balance 1000 -250 0


 


Intake:   


 


    


 


  Intake, IV Titration 450  





  Amount   


 


    Ampicillin-Sulbactam 3 gm 100  





    In Sodium Chloride 0.9%   





    100 ml @ 200 mls/hr IVPB   





    Q6HR [facility] Rx#:[id number]   


 


    Dextrose 5%-0.45% NaCl 1, 300  





    000 ml @ 75 mls/hr IV .   





    [id number] [facility] Rx#:[id number]   


 


    cefTRIAXone 2 gm In 50  





    Sodium Chloride 0.9% 50   





    ml @ 100 mls/hr IVPB   





    Q24HR [facility] Rx#:[id number]   


 


  Oral   0


 


Output:   


 


  Urine  150 


 


  Estimated Blood Loss  100 


 


Other:   


 


  Voiding Method  Indwelling Catheter Toilet














- Labs


CBC & Chem 7: 


                                 09/24/24 05:31





                                 09/24/24 05:31


Labs: 


                  Abnormal Lab Results - Last 24 Hours (Table)











  09/24/24 09/24/24 09/24/24 Range/Units





  05:31 05:31 05:31 


 


Eosinophils #  0 L    (0.04-0.35)  X 10*3/uL


 


PT   13.0 H   (10.0-12.5)  sec


 


INR   1.2 H   (<1.2)  


 


Glucose    146 H  ()  mg/dL


 


Calcium    7.8 L  (8.7-10.3)  mg/dL


 


AST    133 H  (13-35)  U/L


 


ALT    59 H  (8-44)  U/L


 


Albumin    3.3 L  (3.8-4.9)  g/dL


 


Albumin/Globulin Ratio    1.03 L  (1.60-3.17)  Ratio

## 2024-09-25 LAB
ALBUMIN SERPL-MCNC: 2.8 G/DL (ref 3.5–5)
ALBUMIN/GLOB SERPL: 0.8 {RATIO}
ALP SERPL-CCNC: 72 U/L (ref 38–126)
ALT SERPL-CCNC: 45 U/L (ref 4–34)
ANION GAP SERPL CALC-SCNC: 7 MMOL/L
AST SERPL-CCNC: 107 U/L (ref 14–36)
BASOPHILS # BLD AUTO: 0.01 X 10*3/UL (ref 0–0.1)
BASOPHILS NFR BLD AUTO: 0.1 %
BUN SERPL-SCNC: 16 MG/DL (ref 7–17)
CALCIUM SPEC-MCNC: 7.9 MG/DL (ref 8.4–10.2)
CHLORIDE SERPL-SCNC: 102 MMOL/L (ref 98–107)
CO2 SERPL-SCNC: 24 MMOL/L (ref 22–30)
EOSINOPHIL # BLD AUTO: 0 X 10*3/UL (ref 0.04–0.35)
EOSINOPHIL NFR BLD AUTO: 0 %
ERYTHROCYTE [DISTWIDTH] IN BLOOD BY AUTOMATED COUNT: 4.18 X 10*6/UL (ref 4.1–5.2)
ERYTHROCYTE [DISTWIDTH] IN BLOOD: 13.6 % (ref 11.5–14.5)
GLOBULIN SER CALC-MCNC: 3.3 G/DL
GLUCOSE SERPL-MCNC: 144 MG/DL (ref 74–99)
HCT VFR BLD AUTO: 39.4 % (ref 37.2–46.3)
HGB BLD-MCNC: 12.7 G/DL (ref 12–15)
IMM GRANULOCYTES BLD QL AUTO: 0.4 %
LYMPHOCYTES # SPEC AUTO: 1.55 X 10*3/UL (ref 0.9–5)
LYMPHOCYTES NFR SPEC AUTO: 13 %
MAGNESIUM SPEC-SCNC: 2.5 MG/DL (ref 1.6–2.3)
MCH RBC QN AUTO: 30.4 PG (ref 27–32)
MCHC RBC AUTO-ENTMCNC: 32.2 G/DL (ref 32–37)
MCV RBC AUTO: 94.3 FL (ref 80–97)
MONOCYTES # BLD AUTO: 0.81 X 10*3/UL (ref 0.2–1)
MONOCYTES NFR BLD AUTO: 6.8 %
NEUTROPHILS # BLD AUTO: 9.5 X 10*3/UL (ref 1.8–7.7)
NEUTROPHILS NFR BLD AUTO: 79.7 %
NRBC BLD AUTO-RTO: 0 X 10*3/UL (ref 0–0.01)
PLATELET # BLD AUTO: 142 X 10*3/UL (ref 140–440)
POTASSIUM SERPL-SCNC: 5 MMOL/L (ref 3.5–5.1)
PROT SERPL-MCNC: 6.1 G/DL (ref 6.3–8.2)
SODIUM SERPL-SCNC: 133 MMOL/L (ref 137–145)
WBC # BLD AUTO: 11.92 X 10*3/UL (ref 4.5–10)

## 2024-09-25 NOTE — XR
EXAMINATION TYPE: XR chest 1V portable

 

DATE OF EXAM: 9/25/2024 11:48 AM

 

COMPARISON: Chest radiographs from 9/22/2024

 

TECHNIQUE: XR chest 1V portable Portable AP radiograph of the chest.

 

CLINICAL INDICATION:Female, 74 years old with history of atelectasis; 

 

FINDINGS: Patient is rotated which limits evaluation.

Lungs/Pleura: There is no evidence of pleural effusion, focal consolidation, or pneumothorax.  Bibasi
lar subsegmental atelectasis.

Pulmonary vascularity: Unremarkable.

Heart/mediastinum: Cardiomediastinal silhouette is enlarged and stable.  Atherosclerotic calcificatio
ns are seen in the aorta.

Musculoskeletal: No acute osseous pathology.

 

 

IMPRESSION: 

Mild cardiomegaly with bibasilar subsegmental atelectasis.

 

 

[facility], Workstation: [id number], 9/25/2024 11:51 AM

## 2024-09-25 NOTE — P.PN
Subjective


Progress Note Date: 09/25/24





CHIEF COMPLAINT: Gangrenous cholecystitis





HISTORY OF PRESENT ILLNESS: Patient is postop day #2 status post robotic 

laparoscopic cholecystectomy and lysis of adhesions.  Patient is sitting up at 

bedside chair.  Patient reports her pain is controlled.  She denies any nausea 

or vomiting.  Patient reports her appetite is decreased and only eating a small 

amount of the meals.  Patient had a low-grade temp of 100 last night.  White 

count is up slightly at 11.92.  LFTs trending downwards.  FADI drain with 50 mL 

serosanguineous output through the night.  Culture growing Klebsiella pneumonia





PHYSICAL EXAM: 


VITAL SIGNS: Reviewed


GENERAL: Well-developed in no acute distress. 


HEENT:  No sclera icterus. Extraocular movements grossly intact.  Moist buccal 

mucosa. 


Head is atraumatic, normocephalic. Hears conversational speech. No nasal 

drainage.


NECK:  Supple without lymphadenopathy.


CHEST:  Non-labored respirations and equal bilateral excursions. 


CARDIOVASCULAR:   Palpable 2+ radial pulses.


ABDOMEN:  Soft.  Nondistended.  Incision sites clean dry and intact.  FADI drain 

is getting lighter and more serosanguineous in color


MUSCULOSKELETAL:  No clubbing or cyanosis.


NEUROLOGIC:  No focal or lateralizing signs.  Cranial nerves II through XII 

grossly intact.


PSYCH:  Appropriate affect.  Alert and oriented to person, place and time.


SKIN: Well perfused.  Good skin turgor. 





ASSESSMENT: 


1.  Acute hemorrhagic gangrenous cholecystitis with cystic duct obstruction due 

to gallstones


2.  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


3.  Hypertensive heart disease


4.  Atrial fibrillation


5.  Pulmonary hypertension


6.  Obesity due to excess calories, BMI 34.2


7.  COVID-positive





PLAN: 


-Repeat CBC in a.m.


-Ensure completed for protein supplement


-Diflucan dosage increased per infectious disease


-Continue antibiotics per infectious disease


-Continue pain management


-Continue low-fat diet


-Continue to monitor FADI drain output


-Encourage patient to increase activity level


-Encourage patient to use incentive spirometer


-Continue to hold Pradaxa


-DVT prophylaxis subcu heparin and GI prophylaxis Protonix





Physician Assistant note has been reviewed by physician. Signing provider agrees

with the documented findings, assessment, and plan of care.





Objective





- Vital Signs


Vital signs: 


                                   Vital Signs











Temp  98.9 F   09/25/24 08:00


 


Pulse  66   09/25/24 08:00


 


Resp  18   09/25/24 08:00


 


BP  131/79   09/25/24 08:00


 


Pulse Ox  94 L  09/25/24 08:00


 


FiO2      








                                 Intake & Output











 09/24/24 09/25/24 09/25/24





 18:59 06:59 18:59


 


Intake Total 722  118


 


Output Total 200 1600 


 


Balance 522 -1600 118


 


Intake:   


 


  Oral 722  118


 


Output:   


 


  Drainage  50 


 


    Right Lower Abdomen  50 


 


  Urine 200 1550 


 


Other:   


 


  Voiding Method Toilet Indwelling Catheter Indwelling Catheter


 


  # Bowel Movements 0  














- Labs


CBC & Chem 7: 


                                 09/25/24 06:13





                                 09/25/24 04:53


Labs: 


                  Abnormal Lab Results - Last 24 Hours (Table)











  09/25/24 09/25/24 Range/Units





  04:53 06:13 


 


WBC   11.92 H  (4.50-10.00)  X 10*3/uL


 


Immature Gran #   0.05 H  (0.00-0.04)  X 10*3/uL


 


Neutrophils #   9.50 H  (1.80-7.70)  X 10*3/uL


 


Eosinophils #   0 L  (0.04-0.35)  X 10*3/uL


 


Sodium  133 L   (137-145)  mmol/L


 


Glucose  144 H   (74-99)  mg/dL


 


Calcium  7.9 L   (8.4-10.2)  mg/dL


 


Magnesium  2.5 H   (1.6-2.3)  mg/dL


 


AST  107 H   (14-36)  U/L


 


ALT  45 H   (4-34)  U/L


 


Total Protein  6.1 L   (6.3-8.2)  g/dL


 


Albumin  2.8 L   (3.5-5.0)  g/dL








                      Microbiology - Last 24 Hours (Table)











 09/23/24 21:30 Gram Stain - Preliminary





 Other - Other Wound Culture - Preliminary





    Klebsiella pneumoniae

## 2024-09-25 NOTE — P.PN
Subjective


Progress Note Date: 09/25/24


74-year-old woman who presented to the emergency department with history of 

fall, 3 weeks of persistent cough and 3 weeks of right upper quadrant abdominal 

pain.  She has a past medical history significant for atrial fibrillation, 

hypertension and osteoarthritis.  Patient states that she was at home and felt 

her right leg become weak causing her to fall.  States she fell towards her side

and then onto her back on hardwood floor.  Denies any loss of consciousness or 

hitting her head, however her  told her she did hit her head against the 

door.  Patient denies neck pain, back pain, numbness or weakness in her 

extremities, chest pain, shortness of breath or difficulty in breathing, nausea,

vomiting, diarrhea, dysuria, increased urinary frequency.  Per the EMS, 

patient's temperature was 101.6, on arrival to the emergency department was 

100.2 and the patient states she took no antipyretics prior to arrival in the 

emergency department.  Rectal temperature done in the emergency department jamaal

wed a temperature of 103.3.  Patient states that she has had some fevers and 

chills for the last week, and states she had a cough for the last 3 weeks that 

her doctor thought was secondary to her lisinopril use so changed her 

medication, she states the cough is still present.  Notes that the cough is 

nonproductive and there is no blood or sputum.  She does states she has some 

right-sided rib/abdominal pain which she suspected was from coughing, she states

her primary care without the same.  However following resolution to the 

emergency department and patient's abdominal tenderness in the right upper 

quadrant and imaging studies patient was shown to have acute cholecystitis.  

Surgery has been consulted, patient being held n.p.o. in preparation for surgery

later today (9/23).


 


Initial lab work done in the ER showed WBCs 9.9, Hgb 12.8, Hct 38.2, , PT

13.0, INR 1.2, PTT 27.8, sodium 134, potassium 4.0, BUN 19, creatinine 0.70, 

magnesium 1.5, , ALT 63, alkaline phosphatase 178


Urinalysis showed cloudy appearance, 1+ protein, 1+ ketones, small amount of 

blood, rare bacteria, moderate urine mucus, rare yeast





COVID-19 positive





9/24/24 - Patient is post-op day 1.  Patient states she is feeling well, better 

than she was yesterday.  States that she has some abdominal pain which she rates

as a 4/10, however denies nausea, vomiting, fevers, chills and she states that 

she is no longer coughing today.  Per the operative report patient was found to 

have an acute gangrenous cholecystitis with hydrops of the gallbladder to the 

abdominal wall cutaneous fistula with abscess, severe right upper quadrant 

adhesions including pericholecystic adhesions requiring extensive dissections.  

Vital signs of this morning show blood pressure 107/70, heart rate of 69, 

respiratory rate 17, oxygen saturation 90% on room air.  Patient was started on 

Zosyn, will continue the Flagyl however discontinued Rocephin.  Infectious 

disease consulted for their recommendations regarding antibiotic course.





New labs - WBCs 8.64, Hgb 12.5,Hct 30.6, ; PT 13, INR 1.2; sodium 135, 

potassium 4.3, BUN 16.8, creatinine 0.9, , ALT 59





9/25/24 - Patient seen at bedside today, post-op day 2.  Patient states she 

feels good, stated she is doing better than she was yesterday.  Notes mild abd

ominal pain, which she considers to be more tenderness postoperatively, however 

denies nausea, vomiting, fevers, chills and continues to state that her coughing

is no longer a problem.  Patient's Dillard catheter was removed this morning due 

to patient having sufficient urinary output, and she does continue to have 

sufficient urine output following the removal of the catheter.  Per surgery's 

recommendation, patient to continue on a low-fat diet, will continue to monitor 

FADI drain output, encourage use of incentive spirometry as well as increase 

activity level as tolerated.  Infectious disease consulted, per their 

recommendation patient will continue on Zosyn while awaiting final cultures.  

They will continue to follow and provide recommendations as needed.  Per 

cardiology's recommendation, patient underwent echocardiogram which revealed 

ejection fraction 55-60%, showing moderate pulmonary hypertension, mild mitral 

regurgitation, moderate tricuspid regurgitation.  Wound culture growing Kle

bsiella pneumonia.  Discussed with surgery team, plan to repeat CBC and CMP in 

the morning continuing to encourage the patient to use her incentive spirometer,

encouraging increasing activity as patient is able, ensure to increase patient's

protein intake.





New labs - WBCs 11.92, Hgb 12.7, Hct 39.4, ; sodium 133, potassium 5.0, 

calcium 7.9, magnesium 2.5, , ALT 45





Imaging none today - Chest x-ray completed showed mild cardiomegaly with 

bibasilar subsegmental atelectasis





REVIEW OF SYSTEMS: 


CONSTITUTIONAL: Fevers, chills, weakness


HEENT: No recent visual problems or hearing problems. Denied any sore throat. 


CARDIOVASCULAR: No chest pain, orthopnea, PND, no palpitations, no syncope. 


PULMONARY: No shortness of breath, no hemoptysis.  Reports cough.


GASTROINTESTINAL: No diarrhea.  Has had nausea and vomiting, last vomit on 

Saturday (9/21).  Reports abdominal pain.


NEUROLOGICAL: No headaches, no weakness, no numbness. 


HEMATOLOGICAL: Denies any bleeding or petechiae. 


GENITOURINARY: Denies any burning micturition, frequency, or urgency. 


MUSCULOSKELETAL/RHEUMATOLOGICAL: Denies any joint pain, swelling, or any muscle 

pain. 


ENDOCRINE: Denies any polyuria or polydipsia. 


 


The rest of the 14-point review of systems is negative.


 


PHYSICAL EXAMINATION: 


 


GENERAL: The patient is alert and oriented x3, not in any acute distress. Well 

developed, well nourished. 


HEENT: Pupils are round and equally reacting to light. EOMI. No scleral icterus.

No conjunctival pallor. Normocephalic, atraumatic. No pharyngeal erythema. No 

thyromegaly. 


CARDIOVASCULAR: S1 and S2 present. No murmurs, rubs, or gallops.


NECK: C-collar in place, no midline spinal tenderness to palpation


PULMONARY: Chest is clear to auscultation, no wheezing or crackles. 


ABDOMEN: Soft, and nondistended.  Tenderness to the right upper quadrant.


MUSCULOSKELETAL: No joint swelling or deformity.


EXTREMITIES: No cyanosis, clubbing, or pedal edema. 


NEUROLOGICAL: Gross neurological examination did not reveal any focal deficits. 


SKIN: No rashes. 





Assessment and plan





# Acute cholecystitis


 Patient presented with right-sided abdominal pain, which she thought was 

secondary to coughing


 Patient presented with a fever, without elevated white count


 Gallbladder ultrasound completed which was determined to be a suboptimal study

however sonographic findings are concerning for acute cholecystitis; surgery was

consulted


 Patient given 2 g cefepime, 500 mg Flagyl each once


 Patient given 3 g Unasyn every 6 hours per surgery recommendation; switch to 

Rocephin and Flagyl


 Patient has tramadol every 6 hours and morphine 4 mg every 4 hours for pain 

control


 Patient made n.p.o. and preparation for surgery today (9/23)


 Acute gangrenous cholecystitis with hydrops of the gallbladder to the 

abdominal wall cutaneous fistula with abscess, severe right upper quadrant 

adhesions including pericholecystic adhesions requiring extensive dissections


 Discontinue Rocephin and Flagyl; patient started on and will continue Zosyn





# Mild COVID-19 infection


 Patient tested positive for COVID-19


 On arrival patient was saturating 97% on room air, currently saturating 94% on

room air


 Patient has had persistent cough for 3 weeks; 3-week history of fever, 

uncertain origin COVID vs cholecystitis





# History of atrial fibrillation


 Patient has a history of atrial fibrillation


 Noted on EKG in the emergency department atrial fibrillation with RVR


 Patient regularly takes Pradaxa; currently may hold - last dose taken Friday p

er the patient





GI prophylaxis: Protonix 40 mg daily


DVT prophylaxis: Subcutaneous heparin


 


 


Continue to monitor vital signs, monitor CBC, monitor CMP, continue telemetry 

monitoring


Labs and medication were reviewed.  Continue with symptomatic treatment. 








Dictation was produced using dragon dictation software. please excuse any 

grammatical, word or spelling errors. 





The impression and plan of care was discussed with resident and has been 

dictated by Scott Quinn,  as directed.





Dr. Harjinder MD


I have performed a history and physical examination and medical decision making 

of this patient, discussed the same with the dictator, and agree with the 

dictators assessment and plan as written. Based on total visit time, I have 

performed more than 50% of this visit.








Objective





- Vital Signs


Vital signs: 


                                   Vital Signs











Temp  97.4 F L  09/25/24 02:38


 


Pulse  68   09/25/24 02:38


 


Resp  17   09/25/24 02:38


 


BP  107/59   09/25/24 02:38


 


Pulse Ox  94 L  09/25/24 02:38


 


FiO2      








                                 Intake & Output











 09/24/24 09/25/24 09/25/24





 18:59 06:59 18:59


 


Intake Total 722  


 


Output Total 200 1600 


 


Balance 522 -1600 


 


Intake:   


 


  Oral 722  


 


Output:   


 


  Drainage  50 


 


    Right Lower Abdomen  50 


 


  Urine 200 1550 


 


Other:   


 


  Voiding Method Toilet Indwelling Catheter 


 


  # Bowel Movements 0  














- Labs


CBC & Chem 7: 


                                 09/27/24 05:57





                                 09/27/24 05:57


Labs: 


                  Abnormal Lab Results - Last 24 Hours (Table)











  09/24/24 09/24/24 09/25/24 Range/Units





  05:31 05:31 04:53 


 


Eosinophils #  0 L    (0.04-0.35)  X 10*3/uL


 


Sodium    133 L  (137-145)  mmol/L


 


Glucose   146 H  144 H  ()  mg/dL


 


Calcium   7.8 L  7.9 L  (8.7-10.3)  mg/dL


 


Magnesium    2.5 H  (1.6-2.3)  mg/dL


 


AST   133 H  107 H  (13-35)  U/L


 


ALT   59 H  45 H  (8-44)  U/L


 


Total Protein    6.1 L  (6.3-8.2)  g/dL


 


Albumin   3.3 L  2.8 L  (3.8-4.9)  g/dL


 


Albumin/Globulin Ratio   1.03 L   (1.60-3.17)  Ratio

## 2024-09-25 NOTE — P.PN
Subjective


Progress Note Date: 09/25/24


Principal diagnosis: 





Reason for follow-up is acute gangrenous cholecystitis





Patient is a 74-year-old  female with a past medical history 

significant for atrial fibrillation hypertension osteoarthritis presenting to 

the hospital after a fall the patient has been diagnosed with acute gangrenous 

cholecystitis status post laparoscopic cholecystectomy


On today's evaluation that is 09/25/2024,the patient did have resolution of her 

fever and is afebrile this morning, patient is on room air not requiring 

supplemental oxygen and denies any shortness of breath no chest pain or 

cough.Patient denies having any nausea or vomiting, abdominal pain has decreased

in intensity no bowel movement.


Patient white count is 11.92, creatinine 0.81 abdominal cultures growing 

Klebsiella








Objective





- Vital Signs


Vital signs: 


                                   Vital Signs











Temp  98.9 F   09/25/24 08:00


 


Pulse  66   09/25/24 08:00


 


Resp  18   09/25/24 08:00


 


BP  131/79   09/25/24 08:00


 


Pulse Ox  94 L  09/25/24 08:00


 


FiO2      








                                 Intake & Output











 09/24/24 09/25/24 09/25/24





 18:59 06:59 18:59


 


Intake Total 722  118


 


Output Total 200 1600 30


 


Balance 522 -1600 88


 


Intake:   


 


  Oral 722  118


 


Output:   


 


  Drainage  50 30


 


    Right Lower Abdomen  50 30


 


  Urine 200 1550 


 


Other:   


 


  Voiding Method Toilet Indwelling Catheter Indwelling Catheter


 


  # Bowel Movements 0  














- Exam





GENERAL DESCRIPTION: An elderly female lying in bed in no distress





RESPIRATORY SYSTEM: Unlabored breathing , decreased breath sounds at bases





HEART: S1 S2 regular rate and rhythm ,





ABDOMEN: Soft , no tenderness





EXTREMITIES: No edema feet





- Labs


CBC & Chem 7: 


                                 09/25/24 06:13





                                 09/25/24 04:53


Labs: 


                  Abnormal Lab Results - Last 24 Hours (Table)











  09/25/24 09/25/24 Range/Units





  04:53 06:13 


 


WBC   11.92 H  (4.50-10.00)  X 10*3/uL


 


Immature Gran #   0.05 H  (0.00-0.04)  X 10*3/uL


 


Neutrophils #   9.50 H  (1.80-7.70)  X 10*3/uL


 


Eosinophils #   0 L  (0.04-0.35)  X 10*3/uL


 


Sodium  133 L   (137-145)  mmol/L


 


Glucose  144 H   (74-99)  mg/dL


 


Calcium  7.9 L   (8.4-10.2)  mg/dL


 


Magnesium  2.5 H   (1.6-2.3)  mg/dL


 


AST  107 H   (14-36)  U/L


 


ALT  45 H   (4-34)  U/L


 


Total Protein  6.1 L   (6.3-8.2)  g/dL


 


Albumin  2.8 L   (3.5-5.0)  g/dL








                      Microbiology - Last 24 Hours (Table)











 09/23/24 21:30 Gram Stain - Preliminary





 Other - Other Wound Culture - Preliminary





    Klebsiella pneumoniae














Assessment and Plan


(1) COVID-19


Current Visit: Yes   Status: Acute   Code(s): U07.1 - COVID-19   SNOMED Code(s):

316788208


   





(2) Gangrenous cholecystitis


Current Visit: Yes   Status: Acute   Code(s): K81.0 - ACUTE CHOLECYSTITIS   

SNOMED Code(s): 09577170


   


Plan: 





1patient presented to hospital with a fall patient was noticed to be febrile 

with evidence of gangrenous cholecystitis in this patient was status post 

laparoscopic ostectomy and drainage of the abscess with abdominal cultures 

currently pending we will need to cover for the enteric gram-negative to be the 

likely pathogen.


2patient did tested positive for COVID-19 which may be incidental finding as 

she did not have significant respiratory symptoms chest x-ray did not show any 

acute infiltrate may consider discontinuation of dexamethasone keeping in mind 

acute infection with cholecystitis


3-patient abdominal cultures currently growing Klebsiella with sensitivities 

pending we will continue with Zosyn while waiting for the culture to finalize


Dictation was produced using dragon dictation software. please excuse any 

grammatical, word or spelling errors. 








Time with Patient: Less than 30

## 2024-09-26 LAB
ALBUMIN SERPL-MCNC: 2.9 G/DL (ref 3.8–4.9)
ALBUMIN/GLOB SERPL: 1 RATIO (ref 1.6–3.17)
ALP SERPL-CCNC: 71 U/L (ref 41–126)
ALT SERPL-CCNC: 31 U/L (ref 8–44)
ANION GAP SERPL CALC-SCNC: 10.1 MMOL/L (ref 4–12)
AST SERPL-CCNC: 55 U/L (ref 13–35)
BASOPHILS # BLD AUTO: 0.02 X 10*3/UL (ref 0–0.1)
BASOPHILS NFR BLD AUTO: 0.2 %
BUN SERPL-SCNC: 17.7 MG/DL (ref 9–27)
BUN/CREAT SERPL: 25.29 RATIO (ref 12–20)
CALCIUM SPEC-MCNC: 8.1 MG/DL (ref 8.7–10.3)
CHLORIDE SERPL-SCNC: 103 MMOL/L (ref 96–109)
CO2 SERPL-SCNC: 23.9 MMOL/L (ref 21.6–31.8)
EOSINOPHIL # BLD AUTO: 0 X 10*3/UL (ref 0.04–0.35)
EOSINOPHIL NFR BLD AUTO: 0 %
ERYTHROCYTE [DISTWIDTH] IN BLOOD BY AUTOMATED COUNT: 3.91 X 10*6/UL (ref 4.1–5.2)
ERYTHROCYTE [DISTWIDTH] IN BLOOD: 13.4 % (ref 11.5–14.5)
GLOBULIN SER CALC-MCNC: 2.9 G/DL (ref 1.6–3.3)
GLUCOSE SERPL-MCNC: 176 MG/DL (ref 70–110)
HCT VFR BLD AUTO: 36.6 % (ref 37.2–46.3)
HGB BLD-MCNC: 12 G/DL (ref 12–15)
IMM GRANULOCYTES BLD QL AUTO: 0.6 %
LYMPHOCYTES # SPEC AUTO: 1.46 X 10*3/UL (ref 0.9–5)
LYMPHOCYTES NFR SPEC AUTO: 11.6 %
MCH RBC QN AUTO: 30.7 PG (ref 27–32)
MCHC RBC AUTO-ENTMCNC: 32.8 G/DL (ref 32–37)
MCV RBC AUTO: 93.6 FL (ref 80–97)
MONOCYTES # BLD AUTO: 0.68 X 10*3/UL (ref 0.2–1)
MONOCYTES NFR BLD AUTO: 5.4 %
NEUTROPHILS # BLD AUTO: 10.37 X 10*3/UL (ref 1.8–7.7)
NEUTROPHILS NFR BLD AUTO: 82.2 %
NRBC BLD AUTO-RTO: 0 X 10*3/UL (ref 0–0.01)
PLATELET # BLD AUTO: 170 X 10*3/UL (ref 140–440)
POTASSIUM SERPL-SCNC: 4.1 MMOL/L (ref 3.5–5.5)
PROT SERPL-MCNC: 5.8 G/DL (ref 6.2–8.2)
SODIUM SERPL-SCNC: 137 MMOL/L (ref 135–145)
WBC # BLD AUTO: 12.61 X 10*3/UL (ref 4.5–10)

## 2024-09-26 NOTE — P.PN
Subjective


Progress Note Date: 09/26/24





CHIEF COMPLAINT: Gangrenous cholecystitis





HISTORY OF PRESENT ILLNESS: Patient is postop day #3 status post robotic 

laparoscopic cholecystectomy and lysis of adhesions.  Patient is lying in bed 

comfortably.  She is having flatus.  Denies any nausea or vomiting.  She reports

that her appetite is increasing.  Afebrile.  White count did go up from 11-12.  

LFTs trending down.  culture growing Klebsiella pneumonia and E. coli.  FADI drain

20 mL serosanguineous output





PHYSICAL EXAM: 


VITAL SIGNS: Reviewed


GENERAL: Well-developed in no acute distress. 


HEENT:  No sclera icterus. Extraocular movements grossly intact.  Moist buccal 

mucosa. 


Head is atraumatic, normocephalic. Hears conversational speech. No nasal 

drainage.


NECK:  Supple without lymphadenopathy.


CHEST:  Non-labored respirations and equal bilateral excursions. 


CARDIOVASCULAR:   Palpable 2+ radial pulses.


ABDOMEN:  Soft.  Nondistended.  Incision sites clean dry and intact.  FADI drain 

serosanguineous


MUSCULOSKELETAL:  No clubbing or cyanosis.


NEUROLOGIC:  No focal or lateralizing signs.  Cranial nerves II through XII 

grossly intact.


PSYCH:  Appropriate affect.  Alert and oriented to person, place and time.


SKIN: Well perfused.  Good skin turgor. 





ASSESSMENT: 


1.  Acute hemorrhagic gangrenous cholecystitis with cystic duct obstruction due 

to gallstones


2.  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


3.  Hypertensive heart disease


4.  Atrial fibrillation


5.  Pulmonary hypertension


6.  Obesity due to excess calories, BMI 34.2


7.  COVID-positive





PLAN: 


-Repeat CBC in a.m.


-Continue antibiotics per infectious disease


-Continue pain management


-Continue low-fat diet


-Continue to monitor FADI drain output


-Encourage patient to increase activity level.  Physical therapy to help 

ambulate patient


-Encourage patient to use incentive spirometer


-Continue to hold Pradaxa


-DVT prophylaxis subcu heparin and GI prophylaxis Protonix





Physician Assistant note has been reviewed by physician. Signing provider agrees

with the documented findings, assessment, and plan of care.





Objective





- Vital Signs


Vital signs: 


                                   Vital Signs











Temp  97.9 F   09/26/24 06:55


 


Pulse  57 L  09/26/24 06:55


 


Resp  17   09/26/24 06:55


 


BP  111/70   09/26/24 06:55


 


Pulse Ox  94 L  09/26/24 06:55


 


FiO2      








                                 Intake & Output











 09/25/24 09/26/24 09/26/24





 18:59 06:59 18:59


 


Intake Total 236 118 236


 


Output Total 30 60 


 


Balance 206 58 236


 


Intake:   


 


  Oral 236 118 236


 


Output:   


 


  Drainage 30 60 


 


    Right Lower Abdomen 30 60 


 


Other:   


 


  Voiding Method Indwelling Catheter  


 


  # Voids 1 4 














- Labs


CBC & Chem 7: 


                                 09/26/24 06:46





                                 09/26/24 06:46


Labs: 


                  Abnormal Lab Results - Last 24 Hours (Table)











  09/26/24 Range/Units





  06:46 


 


WBC  12.61 H  (4.50-10.00)  X 10*3/uL


 


RBC  3.91 L  (4.10-5.20)  X 10*6/uL


 


Hct  36.6 L  (37.2-46.3)  %


 


Immature Gran #  0.08 H  (0.00-0.04)  X 10*3/uL


 


Neutrophils #  10.37 H  (1.80-7.70)  X 10*3/uL


 


Eosinophils #  0 L  (0.04-0.35)  X 10*3/uL








                      Microbiology - Last 24 Hours (Table)











 09/23/24 21:30 Gram Stain - Final





 Other - Other Wound Culture - Final





    Klebsiella pneumoniae





    Escherichia coli

## 2024-09-26 NOTE — P.PN
Subjective


Progress Note Date: 09/26/24


74-year-old woman who presented to the emergency department with history of 

fall, 3 weeks of persistent cough and 3 weeks of right upper quadrant abdominal 

pain.  She has a past medical history significant for atrial fibrillation, 

hypertension and osteoarthritis.  Patient states that she was at home and felt 

her right leg become weak causing her to fall.  States she fell towards her side

and then onto her back on hardwood floor.  Denies any loss of consciousness or 

hitting her head, however her  told her she did hit her head against the 

door.  Patient denies neck pain, back pain, numbness or weakness in her 

extremities, chest pain, shortness of breath or difficulty in breathing, nausea,

vomiting, diarrhea, dysuria, increased urinary frequency.  Per the EMS, 

patient's temperature was 101.6, on arrival to the emergency department was 

100.2 and the patient states she took no antipyretics prior to arrival in the 

emergency department.  Rectal temperature done in the emergency department jamaal

wed a temperature of 103.3.  Patient states that she has had some fevers and 

chills for the last week, and states she had a cough for the last 3 weeks that 

her doctor thought was secondary to her lisinopril use so changed her 

medication, she states the cough is still present.  Notes that the cough is 

nonproductive and there is no blood or sputum.  She does states she has some 

right-sided rib/abdominal pain which she suspected was from coughing, she states

her primary care without the same.  However following resolution to the 

emergency department and patient's abdominal tenderness in the right upper 

quadrant and imaging studies patient was shown to have acute cholecystitis.  

Surgery has been consulted, patient being held n.p.o. in preparation for surgery

later today (9/23).


 


Initial lab work done in the ER showed WBCs 9.9, Hgb 12.8, Hct 38.2, , PT

13.0, INR 1.2, PTT 27.8, sodium 134, potassium 4.0, BUN 19, creatinine 0.70, 

magnesium 1.5, , ALT 63, alkaline phosphatase 178


Urinalysis showed cloudy appearance, 1+ protein, 1+ ketones, small amount of 

blood, rare bacteria, moderate urine mucus, rare yeast





COVID-19 positive





9/24/24 - Patient is post-op day 1.  Patient states she is feeling well, better 

than she was yesterday.  States that she has some abdominal pain which she rates

as a 4/10, however denies nausea, vomiting, fevers, chills and she states that 

she is no longer coughing today.  Per the operative report patient was found to 

have an acute gangrenous cholecystitis with hydrops of the gallbladder to the 

abdominal wall cutaneous fistula with abscess, severe right upper quadrant 

adhesions including pericholecystic adhesions requiring extensive dissections.  

Vital signs of this morning show blood pressure 107/70, heart rate of 69, 

respiratory rate 17, oxygen saturation 90% on room air.  Patient was started on 

Zosyn, will continue the Flagyl however discontinued Rocephin.  Infectious 

disease consulted for their recommendations regarding antibiotic course.





New labs - WBCs 8.64, Hgb 12.5,Hct 30.6, ; PT 13, INR 1.2; sodium 135, 

potassium 4.3, BUN 16.8, creatinine 0.9, , ALT 59





9/25/24 - Patient seen at bedside today, post-op day 2.  Patient states she 

feels good, stated she is doing better than she was yesterday.  Notes mild abd

ominal pain, which she considers to be more tenderness postoperatively, however 

denies nausea, vomiting, fevers, chills and continues to state that her coughing

is no longer a problem.  Patient's Dillard catheter was removed this morning due 

to patient having sufficient urinary output, and she does continue to have 

sufficient urine output following the removal of the catheter.  Per surgery's 

recommendation, patient to continue on a low-fat diet, will continue to monitor 

FADI drain output, encourage use of incentive spirometry as well as increase 

activity level as tolerated.  Infectious disease consulted, per their 

recommendation patient will continue on Zosyn while awaiting final cultures.  

They will continue to follow and provide recommendations as needed.  Per 

cardiology's recommendation, patient underwent echocardiogram which revealed 

ejection fraction 55-60%, showing moderate pulmonary hypertension, mild mitral 

regurgitation, moderate tricuspid regurgitation.  Wound culture growing Kle

bsiella pneumonia.  Discussed with surgery team, plan to repeat CBC and CMP in 

the morning continuing to encourage the patient to use her incentive spirometer,

encouraging increasing activity as patient is able, ensure to increase patient's

protein intake.





New labs - WBCs 11.92, Hgb 12.7, Hct 39.4, ; sodium 133, potassium 5.0, 

calcium 7.9, magnesium 2.5, , ALT 45





Imaging done today - Chest x-ray completed showed mild cardiomegaly with 

bibasilar subsegmental atelectasis





9/26/24 - Patient seen at bedside today.  She is now postop day 3 and states she

continues to feel much better every day.  She states that she has had continuing

improvement of her mild postsurgical abdominal pain.  Patient did note a fever, 

waking up sweating this morning at 5 AM, and some chills, however the patient 

was able to fall back asleep and woke up feeling fine. Patient denies nausea or 

vomiting.  Since having her Dillard catheter removed, patient has no difficulty 

urinating.  Patient will continue on Zosyn, per infectious disease 

recommendation this is acceptable all awaiting final culture growth.  Labs 

yesterday (9/25) showed a slight elevation in WBCs at 11.92, while the LFTs down

trended from 9/24.  Labs from today showed WBCs .  As result of patient's repeat

chest x-ray done yesterday (9/25) showing bibasilar subsegmental atelectasis, 

patient will continue to be encouraged to utilize the incentive spirometer more 

often and physical therapy will work with the patient to ambulate through the 

halls as able.  Labs show continued downtrend of LFTs, however another slight 

increase in WBCs.





New labs - WBCs 12.6 on, Hgb 12.0, Hct 36.6, ; sodium 137, potassium 4.1,

BUN 17.7, creatinine 0.7, AST 55, ALT 31


Preliminary anaerobic culture showed no growth


Preliminary body fluid Gram stain showed no polymorphonuclear leukocytes and 

organisms seen


Wound culture and Gram stain both final - showed Klebsiella pneumonia headache 

E. coli with susceptibility great available, Klebsiella pneumonia showing 

resistant ampicillin full susceptibility otherwise





REVIEW OF SYSTEMS: 


CONSTITUTIONAL: Fevers, chills, weakness


HEENT: No recent visual problems or hearing problems. Denied any sore throat. 


CARDIOVASCULAR: No chest pain, orthopnea, PND, no palpitations, no syncope. 


PULMONARY: No shortness of breath, no hemoptysis.  Reports cough has not been 

bothering her the past 2 days.


GASTROINTESTINAL: No diarrhea.  Denies nausea and vomiting.  Reports abdominal 

pain has improved since surgery, very mild postsurgical tenderness.


NEUROLOGICAL: No headaches, no weakness, no numbness. 


HEMATOLOGICAL: Denies any bleeding or petechiae. 


GENITOURINARY: Denies any burning micturition, frequency, or urgency. 


MUSCULOSKELETAL/RHEUMATOLOGICAL: Denies any joint pain, swelling, or any muscle 

pain. 


ENDOCRINE: Denies any polyuria or polydipsia. 


 


The rest of the 14-point review of systems is negative.


 


PHYSICAL EXAMINATION: 


 


GENERAL: The patient is alert and oriented x3, not in any acute distress. Well 

developed, well nourished. 


HEENT: Pupils are round and equally reacting to light. EOMI. No scleral icterus.

No conjunctival pallor. Normocephalic, atraumatic. No pharyngeal erythema. No 

thyromegaly. 


CARDIOVASCULAR: S1 and S2 present. No murmurs, rubs, or gallops.


NECK: No midline spinal tenderness to palpation


PULMONARY: Chest is clear to auscultation, no wheezing or crackles. 


ABDOMEN: Soft, and nondistended.  Mild postsurgical tenderness.


MUSCULOSKELETAL: No joint swelling or deformity.


EXTREMITIES: No cyanosis, clubbing, or pedal edema. 


NEUROLOGICAL: Gross neurological examination did not reveal any focal deficits. 


SKIN: No rashes. 





Assessment and plan





# Acute cholecystitis


 Patient presented with right-sided abdominal pain, which she thought was 

secondary to coughing


 Patient presented with a fever, without elevated white count


 Gallbladder ultrasound completed which was determined to be a suboptimal study

however sonographic findings are concerning for acute cholecystitis; surgery was

consulted


 Patient given 2 g cefepime, 500 mg Flagyl each once


 Patient given 3 g Unasyn every 6 hours per surgery recommendation; switch to 

Rocephin and Flagyl


 Patient has tramadol every 6 hours and morphine 4 mg every 4 hours for pain c

ontrol


 Patient made n.p.o. and preparation for surgery today (9/23)


 Acute gangrenous cholecystitis with hydrops of the gallbladder to the 

abdominal wall cutaneous fistula with abscess, severe right upper quadrant 

adhesions including pericholecystic adhesions requiring extensive dissections


 Discontinue Rocephin and Flagyl; patient started on and will continue Zosyn


 Preliminary wound culture growing Klebsiella pneumonia, per IDs recommendation

continue with Zosyn while awaiting final culture


 Patient encouraged to use incentive spirometer more frequently, chest x-ray 

completed on 9/25 showed subsegmental bibasilar atelectasis; overnight 

saturating 93% on room air


 Final wound culture shows growth of Klebsiella pneumonia and E. coli; pending 

further recommendation from infectious disease





# Mild COVID-19 infection


 Patient tested positive for COVID-19


 On arrival patient was saturating 97% on room air, currently saturating 94% on

room air


 Patient has had persistent cough for 3 weeks; 3-week history of fever, 

uncertain origin COVID vs cholecystitis





# History of atrial fibrillation


 Patient has a history of atrial fibrillation


 Noted on EKG in the emergency department atrial fibrillation with RVR


 Patient regularly takes Pradaxa; currently may hold - last dose taken Friday 

per the patient





GI prophylaxis: Protonix 40 mg daily


DVT prophylaxis: Subcutaneous heparin


 


 


Continue to monitor vital signs, monitor CBC, monitor CMP, continue telemetry 

monitoring


Labs and medication were reviewed.  Continue with symptomatic treatment. 








Dictation was produced using dragon dictation software. please excuse any 

grammatical, word or spelling errors.





Objective





- Vital Signs


Vital signs: 


                                   Vital Signs











Temp  97.9 F   09/26/24 00:51


 


Pulse  73   09/26/24 00:51


 


Resp  16   09/26/24 00:51


 


BP  164/79   09/26/24 00:51


 


Pulse Ox  93 L  09/26/24 00:51


 


FiO2      








                                 Intake & Output











 09/25/24 09/26/24 09/26/24





 18:59 06:59 18:59


 


Intake Total 236 118 


 


Output Total 30 60 


 


Balance 206 58 


 


Intake:   


 


  Oral 236 118 


 


Output:   


 


  Drainage 30 60 


 


    Right Lower Abdomen 30 60 


 


Other:   


 


  Voiding Method Indwelling Catheter  


 


  # Voids 1 4 














- Labs


CBC & Chem 7: 


                                 09/26/24 06:46





                                 09/26/24 06:46


Labs: 


                  Abnormal Lab Results - Last 24 Hours (Table)











  09/25/24 Range/Units





  06:13 


 


WBC  11.92 H  (4.50-10.00)  X 10*3/uL


 


Immature Gran #  0.05 H  (0.00-0.04)  X 10*3/uL


 


Neutrophils #  9.50 H  (1.80-7.70)  X 10*3/uL


 


Eosinophils #  0 L  (0.04-0.35)  X 10*3/uL








                      Microbiology - Last 24 Hours (Table)











 09/23/24 21:30 Gram Stain - Preliminary





 Other - Other Wound Culture - Preliminary





    Klebsiella pneumoniae

## 2024-09-26 NOTE — P.PN
Subjective


Progress Note Date: 09/26/24


Principal diagnosis: 





Reason for follow-up is acute gangrenous cholecystitis





Patient is a 74-year-old  female with a past medical history 

significant for atrial fibrillation hypertension osteoarthritis presenting to 

the hospital after a fall the patient has been diagnosed with acute gangrenous 

cholecystitis status post laparoscopic cholecystectomy


On today's evaluation that is 09/26/2024, the patient continues to be afebrile, 

the patient is on room air and breathing comfortably, the Pt denies having any 

chest pain or cough, the patient denies having any nausea vomiting abdominal 

pain has decreased intensity no diarrhea.


Patient white count is m.4.61, creatinine 0.7 local culture growing Klebsiella 

and E. coli sensitive pathogen





Objective





- Vital Signs


Vital signs: 


                                   Vital Signs











Temp  97.9 F   09/26/24 06:55


 


Pulse  57 L  09/26/24 06:55


 


Resp  17   09/26/24 06:55


 


BP  111/70   09/26/24 06:55


 


Pulse Ox  94 L  09/26/24 06:55


 


FiO2      








                                 Intake & Output











 09/25/24 09/26/24 09/26/24





 18:59 06:59 18:59


 


Intake Total 236 118 536


 


Output Total 30 60 


 


Balance 206 58 536


 


Intake:   


 


  Oral 236 118 536


 


Output:   


 


  Drainage 30 60 


 


    Right Lower Abdomen 30 60 


 


Other:   


 


  Voiding Method Indwelling Catheter  


 


  # Voids 1 4 2


 


  # Bowel Movements   2














- Exam





GENERAL DESCRIPTION: An elderly female lying in bed in no distress





RESPIRATORY SYSTEM: Unlabored breathing , decreased breath sounds at bases





HEART: S1 S2 regular rate and rhythm ,





ABDOMEN: Soft , no tenderness





EXTREMITIES: No edema feet





- Labs


CBC & Chem 7: 


                                 09/26/24 06:46





                                 09/26/24 06:46


Labs: 


                  Abnormal Lab Results - Last 24 Hours (Table)











  09/26/24 09/26/24 Range/Units





  06:46 06:46 


 


WBC  12.61 H   (4.50-10.00)  X 10*3/uL


 


RBC  3.91 L   (4.10-5.20)  X 10*6/uL


 


Hct  36.6 L   (37.2-46.3)  %


 


Immature Gran #  0.08 H   (0.00-0.04)  X 10*3/uL


 


Neutrophils #  10.37 H   (1.80-7.70)  X 10*3/uL


 


Eosinophils #  0 L   (0.04-0.35)  X 10*3/uL


 


BUN/Creatinine Ratio   25.29 H  (12.00-20.00)  Ratio


 


Glucose   176 H  ()  mg/dL


 


Calcium   8.1 L  (8.7-10.3)  mg/dL


 


AST   55 H  (13-35)  U/L


 


Total Protein   5.8 L  (6.2-8.2)  g/dL


 


Albumin   2.9 L  (3.8-4.9)  g/dL


 


Albumin/Globulin Ratio   1.00 L  (1.60-3.17)  Ratio








                      Microbiology - Last 24 Hours (Table)











 09/23/24 21:30 Gram Stain - Final





 Other - Other Wound Culture - Final





    Klebsiella pneumoniae





    Escherichia coli














Assessment and Plan


(1) COVID-19


Current Visit: Yes   Status: Acute   Code(s): U07.1 - COVID-19   SNOMED Code(s):

385909344


   





(2) Gangrenous cholecystitis


Current Visit: Yes   Status: Acute   Code(s): K81.0 - ACUTE CHOLECYSTITIS   

SNOMED Code(s): 83801706


   


Plan: 





1patient presented to hospital with a fall patient was noticed to be febrile 

with evidence of gangrenous cholecystitis in this patient was status post 

laparoscopic ostectomy and drainage of the abscess with abdominal cultures 

currently pending we will need to cover for the enteric gram-negative to be the 

likely pathogen.


2patient did tested positive for COVID-19 which may be incidental finding as 

she did not have significant respiratory symptoms chest x-ray did not show any 

acute infiltrate may consider discontinuation of dexamethasone keeping in mind 

acute infection with cholecystitis


3-patient abdominal cultures currently growing Klebsiella and E. coli which are 

sensitive pathogen for the patient is covered with Zosyn we will be able to 

finish therapy with oral antibiotics 


4worsening white count more likely related to steroids as no evidence of any 

worsening infection dexamethasone discontinued today we will repeat a CBC with 

a.m. lab


Dictation was produced using dragon dictation software. please excuse any 

grammatical, word or spelling errors. 








Time with Patient: Less than 30

## 2024-09-27 VITALS — RESPIRATION RATE: 16 BRPM

## 2024-09-27 VITALS — SYSTOLIC BLOOD PRESSURE: 127 MMHG | HEART RATE: 45 BPM | DIASTOLIC BLOOD PRESSURE: 79 MMHG | TEMPERATURE: 97.9 F

## 2024-09-27 LAB
ALBUMIN SERPL-MCNC: 2.9 G/DL (ref 3.8–4.9)
ALBUMIN/GLOB SERPL: 1 RATIO (ref 1.6–3.17)
ALP SERPL-CCNC: 70 U/L (ref 41–126)
ALT SERPL-CCNC: 30 U/L (ref 8–44)
ANION GAP SERPL CALC-SCNC: 10.2 MMOL/L (ref 4–12)
AST SERPL-CCNC: 42 U/L (ref 13–35)
BASOPHILS # BLD AUTO: 0.03 X 10*3/UL (ref 0–0.1)
BASOPHILS NFR BLD AUTO: 0.2 %
BUN SERPL-SCNC: 20.7 MG/DL (ref 9–27)
BUN/CREAT SERPL: 25.88 RATIO (ref 12–20)
CALCIUM SPEC-MCNC: 8.2 MG/DL (ref 8.7–10.3)
CHLORIDE SERPL-SCNC: 104 MMOL/L (ref 96–109)
CO2 SERPL-SCNC: 23.8 MMOL/L (ref 21.6–31.8)
EOSINOPHIL # BLD AUTO: 0.01 X 10*3/UL (ref 0.04–0.35)
EOSINOPHIL NFR BLD AUTO: 0.1 %
ERYTHROCYTE [DISTWIDTH] IN BLOOD BY AUTOMATED COUNT: 3.9 X 10*6/UL (ref 4.1–5.2)
ERYTHROCYTE [DISTWIDTH] IN BLOOD: 13.5 % (ref 11.5–14.5)
GLOBULIN SER CALC-MCNC: 2.9 G/DL (ref 1.6–3.3)
GLUCOSE SERPL-MCNC: 145 MG/DL (ref 70–110)
HCT VFR BLD AUTO: 37.2 % (ref 37.2–46.3)
HGB BLD-MCNC: 11.9 G/DL (ref 12–15)
IMM GRANULOCYTES BLD QL AUTO: 0.5 %
LYMPHOCYTES # SPEC AUTO: 1.66 X 10*3/UL (ref 0.9–5)
LYMPHOCYTES NFR SPEC AUTO: 12.8 %
MCH RBC QN AUTO: 30.5 PG (ref 27–32)
MCHC RBC AUTO-ENTMCNC: 32 G/DL (ref 32–37)
MCV RBC AUTO: 95.4 FL (ref 80–97)
MONOCYTES # BLD AUTO: 0.79 X 10*3/UL (ref 0.2–1)
MONOCYTES NFR BLD AUTO: 6.1 %
NEUTROPHILS # BLD AUTO: 10.45 X 10*3/UL (ref 1.8–7.7)
NEUTROPHILS NFR BLD AUTO: 80.3 %
NRBC BLD AUTO-RTO: 0 X 10*3/UL (ref 0–0.01)
PLATELET # BLD AUTO: 183 X 10*3/UL (ref 140–440)
POTASSIUM SERPL-SCNC: 4.5 MMOL/L (ref 3.5–5.5)
PROT SERPL-MCNC: 5.8 G/DL (ref 6.2–8.2)
SODIUM SERPL-SCNC: 138 MMOL/L (ref 135–145)
WBC # BLD AUTO: 13 X 10*3/UL (ref 4.5–10)

## 2024-09-27 RX ADMIN — PANTOPRAZOLE SODIUM SCH MG: 40 TABLET, DELAYED RELEASE ORAL at 05:36

## 2024-09-27 NOTE — P.DS
Providers


Date of admission: 


09/23/24 02:06





Attending physician: 


Simran Grande





Consults: 





                                        





09/23/24 02:04


Consult Physician Routine 


   Consulting Provider: Sue Martinez


   Consult Reason/Comments: Cholecystitis


   Do you want consulting provider notified?: Already Contacted





09/23/24 22:04


Consult Physician Routine 


   Consulting Provider: Toñito Loar


   Consult Reason/Comments: Abscess from gallbladder to cutaneous 

fistula/gangrenous


                                                cholecystitis


   Do you want consulting provider notified?: Yes, Notify in am





09/24/24 11:50


Consult Physician Routine 


   Consulting Provider: Toñito Lora


   Consult Reason/Comments: Gangrenous Cholecystectomy w/abdominal wall abcess


   Do you want consulting provider notified?: Yes











Primary care physician: 


Eleno León





[facility] Course: 


Discharge diagnoses;


# Acute gangrenous cholecystitis


# Mild COVID-19 infection


# History of atrial fibrillation





Hospital course;


74-year-old woman who presented to the emergency department with history of 

fall, 3 weeks of persistent cough and 3 weeks of right upper quadrant abdominal 

pain.  She has a past medical history significant for atrial fibrillation, 

hypertension and osteoarthritis.  Patient states that she was at home and felt 

her right leg become weak causing her to fall.  States she fell towards her side

and then onto her back on hardwood floor.  Denies any loss of consciousness or 

hitting her head, however her  told her she did hit her head against the 

door.  Patient denies neck pain, back pain, numbness or weakness in her 

extremities, chest pain, shortness of breath or difficulty in breathing, nausea,

vomiting, diarrhea, dysuria, increased urinary frequency.  Per the EMS, 

patient's temperature was 101.6, on arrival to the emergency department was 

100.2 and the patient states she took no antipyretics prior to arrival in the 

emergency department.  Rectal temperature done in the emergency department 

showed a temperature of 103.3.  Patient states that she has had some fevers and 

chills for the last week, and states she had a cough for the last 3 weeks that 

her doctor thought was secondary to her lisinopril use so changed her medi

cation, she states the cough is still present.  Notes that the cough is 

nonproductive and there is no blood or sputum.  She does states she has some 

right-sided rib/abdominal pain which she suspected was from coughing, she states

her primary care without the same.  However following resolution to the 

emergency department and patient's abdominal tenderness in the right upper 

quadrant and imaging studies patient was shown to have acute cholecystitis.  

Surgery has been consulted, patient being held n.p.o. in preparation for surgery

later today (9/23).


 


Initial lab work done in the ER showed WBCs 9.9, Hgb 12.8, Hct 38.2, , PT

13.0, INR 1.2, PTT 27.8, sodium 134, potassium 4.0, BUN 19, creatinine 0.70, 

magnesium 1.5, , ALT 63, alkaline phosphatase 178


Urinalysis showed cloudy appearance, 1+ protein, 1+ ketones, small amount of 

blood, rare bacteria, moderate urine mucus, rare yeast





COVID-19 positive





9/24/24 - Patient is post-op day 1.  Patient states she is feeling well, better 

than she was yesterday.  States that she has some abdominal pain which she rates

as a 4/10, however denies nausea, vomiting, fevers, chills and she states that 

she is no longer coughing today.  Per the operative report patient was found to 

have an acute gangrenous cholecystitis with hydrops of the gallbladder to the 

abdominal wall cutaneous fistula with abscess, severe right upper quadrant adh

esions including pericholecystic adhesions requiring extensive dissections.  

Vital signs of this morning show blood pressure 107/70, heart rate of 69, 

respiratory rate 17, oxygen saturation 90% on room air.  Patient was started on 

Zosyn, will continue the Flagyl however discontinued Rocephin.  Infectious 

disease consulted for their recommendations regarding antibiotic course.





New labs - WBCs 8.64, Hgb 12.5,Hct 30.6, ; PT 13, INR 1.2; sodium 135, 

potassium 4.3, BUN 16.8, creatinine 0.9, , ALT 59





9/25/24 - Patient seen at bedside today, post-op day 2.  Patient states she 

feels good, stated she is doing better than she was yesterday.  Notes mild 

abdominal pain, which she considers to be more tenderness postoperatively, 

however denies nausea, vomiting, fevers, chills and continues to state that her 

coughing is no longer a problem.  Patient's Dillard catheter was removed this 

morning due to patient having sufficient urinary output, and she does continue 

to have sufficient urine output following the removal of the catheter.  Per 

surgery's recommendation, patient to continue on a low-fat diet, will continue 

to monitor FADI drain output, encourage use of incentive spirometry as well as 

increase activity level as tolerated.  Infectious disease consulted, per their 

recommendation patient will continue on Zosyn while awaiting final cultures.  

They will continue to follow and provide recommendations as needed.  Per 

cardiology's recommendation, patient underwent echocardiogram which revealed 

ejection fraction 55-60%, showing moderate pulmonary hypertension, mild mitral 

regurgitation, moderate tricuspid regurgitation.  Wound culture growing 

Klebsiella pneumonia.  Discussed with surgery team, plan to repeat CBC and CMP 

in the morning continuing to encourage the patient to use her incentive 

spirometer, encouraging increasing activity as patient is able, ensure to 

increase patient's protein intake.





New labs - WBCs 11.92, Hgb 12.7, Hct 39.4, ; sodium 133, potassium 5.0, 

calcium 7.9, magnesium 2.5, , ALT 45





Imaging done today - Chest x-ray completed showed mild cardiomegaly with 

bibasilar subsegmental atelectasis





9/26/24 - Patient seen at bedside today.  She is now postop day 3 and states she

continues to feel much better every day.  She states that she has had continuing

improvement of her mild postsurgical abdominal pain.  Patient did note a fever, 

waking up sweating this morning at 5 AM, and some chills, however the patient 

was able to fall back asleep and woke up feeling fine. Patient denies nausea or 

vomiting.  Since having her Dillard catheter removed, patient has no difficulty 

urinating.  Patient will continue on Zosyn, per infectious disease 

recommendation this is acceptable all awaiting final culture growth.  Labs 

yesterday (9/25) showed a slight elevation in WBCs at 11.92, while the LFTs down

trended from 9/24.  Labs from today showed WBCs .  As result of patient's repeat

chest x-ray done yesterday (9/25) showing bibasilar subsegmental atelectasis, 

patient will continue to be encouraged to utilize the incentive spirometer more 

often and physical therapy will work with the patient to ambulate through the 

halls as able.  Labs show continued downtrend of LFTs, however another slight 

increase in WBCs.





New labs - WBCs 12.6 on, Hgb 12.0, Hct 36.6, ; sodium 137, potassium 4.1,

BUN 17.7, creatinine 0.7, AST 55, ALT 31


Preliminary anaerobic culture showed no growth


Preliminary body fluid Gram stain showed no polymorphonuclear leukocytes and 

organisms seen


Wound culture and Gram stain both final - showed Klebsiella pneumonia headache 

E. coli with susceptibility great available, Klebsiella pneumonia showing 

resistant ampicillin full susceptibility otherwise





9/27/24 - Patient seen at bedside today.  She is now postop day 4 and states she

is feeling better, as she has every day since surgery.  Per infectious disease 

recommendation Decadron discontinued yesterday as the increasing WBCs seem to be

secondary to use of the steroid, as the patient's infection seems to be getting 

better clinically.  Additionally, per infectious disease patient to be 

discharged on ciprofloxacin 500 mg twice daily and Flagyl 500 mg 3 times daily 

for 10 days.  Surgery and infectious disease have both given the okay for the 

patient to be discharged.





PHYSICAL EXAMINATION: 





GENERAL: The patient is alert and oriented x3, not in any acute distress. Well 

developed, well nourished. 


HEENT: Pupils are round and equally reacting to light. EOMI. No scleral icterus.

No conjunctival pallor. Normocephalic, atraumatic. No pharyngeal erythema. No 

thyromegaly. 


CARDIOVASCULAR: S1 and S2 present. No murmurs, rubs, or gallops. 


PULMONARY: Chest is clear to auscultation, no wheezing or crackles. 


ABDOMEN: Soft, nontender, nondistended, normoactive bowel sounds. No palpable 

organomegaly. 


MUSCULOSKELETAL: No joint swelling or deformity.


EXTREMITIES: No cyanosis, clubbing, or pedal edema. 


NEUROLOGICAL: Gross neurological examination did not reveal any focal deficits. 


SKIN: No rashes. 











Dictation was produced using dragon dictation software. please excuse any 

grammatical, word or spelling errors.





Plan - Discharge Summary


New Discharge Prescriptions: 


New


   Acetaminophen Tab [Tylenol] 1,000 mg PO Q6HR PRN #30 tablet


     PRN Reason: Pain


   Ciprofloxacin [Cipro Susp] 500 mg PO BID #20 ml


   metroNIDAZOLE [Flagyl] 500 mg PO TID #30 tab





Continue


   Loratadine [Claritin] 10 mg PO DAILY


   Dabigatran Etexilate Mesylate [Dabigatran Etexilate] 150 mg PO BID #0


   hydroCHLOROthiazide [Hydrodiuril] 12.5 mg PO DAILY


   Metoprolol Succinate (ER) [Toprol XL] 25 mg PO DAILY


   Irbesartan 150 mg PO DAILY


Discharge Medication List





Metoprolol Succinate (ER) [Toprol XL] 25 mg PO DAILY 11/30/22 [History]


hydroCHLOROthiazide [Hydrodiuril] 12.5 mg PO DAILY 11/30/22 [History]


Irbesartan 150 mg PO DAILY 09/23/24 [History]


Loratadine [Claritin] 10 mg PO DAILY 09/23/24 [History]


Acetaminophen Tab [Tylenol] 1,000 mg PO Q6HR PRN #30 tablet 09/27/24 [Rx]


Ciprofloxacin [Cipro Susp] 500 mg PO BID #20 ml 09/27/24 [Rx]


Dabigatran Etexilate Mesylate [Dabigatran Etexilate] 150 mg PO BID #0 09/27/24 

[Rx]


metroNIDAZOLE [Flagyl] 500 mg PO TID #30 tab 09/27/24 [Rx]








Follow up Appointment(s)/Referral(s): 


Sue Martinez MD [STAFF PHYSICIAN] - 10/01/24


[facility], [NON-STAFF] - As Needed


Eleno León DO [Primary Care Provider] - 1-2 days


Carlos Garcia MD [STAFF PHYSICIAN] - 09/30/24 12:00 pm

## 2024-09-27 NOTE — P.PN
Subjective


Progress Note Date: 09/27/24


Principal diagnosis: 





Reason for follow-up is acute gangrenous cholecystitis





Patient is a 74-year-old  female with a past medical history 

significant for atrial fibrillation hypertension osteoarthritis presenting to 

the hospital after a fall the patient has been diagnosed with acute gangrenous 

cholecystitis status post laparoscopic cholecystectomy


On today's evaluation that is 09/27/2024, Patient is afebrile patient is 

currently on room air and denies having any shortness of breath, the patient 

denies any chest pain did have a mild r cough, the patient denies any nausea 

vomiting did not have any abdominal pain and no diarrhea, mention feeling 

better.


Patient white count is 13,000, creatinine is 0.8





Objective





- Vital Signs


Vital signs: 


                                   Vital Signs











Temp  97.6 F   09/27/24 07:25


 


Pulse  46 L  09/27/24 07:25


 


Resp  16   09/27/24 07:25


 


BP  136/78   09/27/24 07:25


 


Pulse Ox  97   09/27/24 07:25


 


FiO2      








                                 Intake & Output











 09/26/24 09/27/24 09/27/24





 18:59 06:59 18:59


 


Intake Total 654  100


 


Output Total 20 20 


 


Balance 634 -20 100


 


Intake:   


 


  Oral 654  100


 


Output:   


 


  Drainage 20 20 


 


    Right Lower Abdomen 20 20 


 


Other:   


 


  # Voids 2 2 


 


  # Bowel Movements 2  














- Exam





GENERAL DESCRIPTION: An elderly female lying in bed in no distress





RESPIRATORY SYSTEM: Unlabored breathing , decreased breath sounds at bases





HEART: S1 S2 regular rate and rhythm ,





ABDOMEN: Soft , no tenderness





EXTREMITIES: No edema feet





- Labs


CBC & Chem 7: 


                                 09/27/24 05:57





                                 09/27/24 05:57


Labs: 


                  Abnormal Lab Results - Last 24 Hours (Table)











  09/27/24 09/27/24 Range/Units





  05:57 05:57 


 


WBC  13.00 H   (4.50-10.00)  X 10*3/uL


 


RBC  3.90 L   (4.10-5.20)  X 10*6/uL


 


Hgb  11.9 L   (12.0-15.0)  g/dL


 


Immature Gran #  0.06 H   (0.00-0.04)  X 10*3/uL


 


Neutrophils #  10.45 H   (1.80-7.70)  X 10*3/uL


 


Eosinophils #  0.01 L   (0.04-0.35)  X 10*3/uL


 


BUN/Creatinine Ratio   25.88 H  (12.00-20.00)  Ratio


 


Glucose   145 H  ()  mg/dL


 


Calcium   8.2 L  (8.7-10.3)  mg/dL


 


AST   42 H  (13-35)  U/L


 


Total Protein   5.8 L  (6.2-8.2)  g/dL


 


Albumin   2.9 L  (3.8-4.9)  g/dL


 


Albumin/Globulin Ratio   1.00 L  (1.60-3.17)  Ratio








                      Microbiology - Last 24 Hours (Table)











 09/25/24 14:45 Gram Stain - Preliminary





 [facility] Body Fluid Culture - Preliminary


 


 09/23/24 21:30 Anaerobic Culture - Preliminary





 Other - Other 


 


 09/23/24 21:30 Gram Stain - Final





 Other - Other Wound Culture - Final





    Klebsiella pneumoniae





    Escherichia coli














Assessment and Plan


(1) COVID-19


Current Visit: Yes   Status: Acute   Code(s): U07.1 - COVID-19   SNOMED Code(s):

683477364


   





(2) Gangrenous cholecystitis


Current Visit: Yes   Status: Acute   Code(s): K81.0 - ACUTE CHOLECYSTITIS   

SNOMED Code(s): 59258915


   


Plan: 





1patient presented to hospital with a fall patient was noticed to be febrile 

with evidence of gangrenous cholecystitis in this patient was status post 

laparoscopic ostectomy and drainage of the abscess with abdominal cultures 

currently pending we will need to cover for the enteric gram-negative to be the 

likely pathogen.


2patient did tested positive for COVID-19 which may be incidental finding as 

she did not have significant respiratory symptoms chest x-ray did not show any 

acute infiltrate may consider discontinuation of dexamethasone keeping in mind 

acute infection with cholecystitis


3-patient abdominal cultures currently growing Klebsiella and E. coli which are 

sensitive pathogen for the patient is covered with Zosyn with a plan to finish 

therapy with oral Cipro and Flagyl x 10 days on discharge discussed with the 

resident physician working on discharge


Dictation was produced using dragon dictation software. please excuse any gramma

tical, word or spelling errors. 








Time with Patient: Less than 30

## 2024-09-27 NOTE — P.PN
Subjective


Progress Note Date: 09/27/24





CHIEF COMPLAINT: Gangrenous cholecystitis





HISTORY OF PRESENT ILLNESS: Patient is postop day #4 status post Robotic 

laparoscopic cholecystectomy and lysis of adhesions.  Patient reports her pain 

is controlled.  She did have loose stools yesterday.  Denies any nausea or 

vomiting.  She did report leaking around the FADI drain after she had showered.  

Afebrile.  WBC is up from 12.6-13 hemoglobin 11.9. culture growing Klebsiella 

pneumonia and E. coli.  FADI drain 20 mL serosanguineous output





PHYSICAL EXAM: 


VITAL SIGNS: Reviewed


GENERAL: Well-developed in no acute distress. 


HEENT:  No sclera icterus. Extraocular movements grossly intact.  Moist buccal 

mucosa. 


Head is atraumatic, normocephalic. Hears conversational speech. No nasal 

drainage.


NECK:  Supple without lymphadenopathy.


CHEST:  Non-labored respirations and equal bilateral excursions. 


CARDIOVASCULAR:   Palpable 2+ radial pulses.


ABDOMEN:  Soft.  Nondistended.  Incision sites clean dry and intact.  FADI drain 

serosanguineous


MUSCULOSKELETAL:  No clubbing or cyanosis.


NEUROLOGIC:  No focal or lateralizing signs.  Cranial nerves II through XII 

grossly intact.


PSYCH:  Appropriate affect.  Alert and oriented to person, place and time.


SKIN: Well perfused.  Good skin turgor. 





ASSESSMENT: 


1.  Acute hemorrhagic gangrenous cholecystitis with cystic duct obstruction due 

to gallstones


2.  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


3.  Hypertensive heart disease


4.  Atrial fibrillation


5.  Pulmonary hypertension


6.  Obesity due to excess calories, BMI 34.2


7.  COVID-positive





PLAN:


-Patient can be discharged from surgical standpoint when cleared by infectious 

disease


-Discharge antibiotic regimen per infectious disease


-Continue pain management


-Continue low-fat diet


-Continue to increase activity level


-DVT prophylaxis subcu heparin and GI prophylaxis Protonix





Physician Assistant note has been reviewed by physician. Signing provider agrees

with the documented findings, assessment, and plan of care.





Objective





- Vital Signs


Vital signs: 


                                   Vital Signs











Temp  97.6 F   09/27/24 07:25


 


Pulse  46 L  09/27/24 07:25


 


Resp  16   09/27/24 07:25


 


BP  136/78   09/27/24 07:25


 


Pulse Ox  97   09/27/24 07:25


 


FiO2      








                                 Intake & Output











 09/26/24 09/27/24 09/27/24





 18:59 06:59 18:59


 


Intake Total 654  100


 


Output Total 20 20 


 


Balance 634 -20 100


 


Intake:   


 


  Oral 654  100


 


Output:   


 


  Drainage 20 20 


 


    Right Lower Abdomen 20 20 


 


Other:   


 


  # Voids 2 2 


 


  # Bowel Movements 2  














- Labs


CBC & Chem 7: 


                                 09/27/24 05:57





                                 09/27/24 05:57


Labs: 


                  Abnormal Lab Results - Last 24 Hours (Table)











  09/27/24 09/27/24 Range/Units





  05:57 05:57 


 


WBC  13.00 H   (4.50-10.00)  X 10*3/uL


 


RBC  3.90 L   (4.10-5.20)  X 10*6/uL


 


Hgb  11.9 L   (12.0-15.0)  g/dL


 


Immature Gran #  0.06 H   (0.00-0.04)  X 10*3/uL


 


Neutrophils #  10.45 H   (1.80-7.70)  X 10*3/uL


 


Eosinophils #  0.01 L   (0.04-0.35)  X 10*3/uL


 


BUN/Creatinine Ratio   25.88 H  (12.00-20.00)  Ratio


 


Glucose   145 H  ()  mg/dL


 


Calcium   8.2 L  (8.7-10.3)  mg/dL


 


AST   42 H  (13-35)  U/L


 


Total Protein   5.8 L  (6.2-8.2)  g/dL


 


Albumin   2.9 L  (3.8-4.9)  g/dL


 


Albumin/Globulin Ratio   1.00 L  (1.60-3.17)  Ratio








                      Microbiology - Last 24 Hours (Table)











 09/25/24 14:45 Gram Stain - Preliminary





 [facility] Body Fluid Culture - Preliminary


 


 09/23/24 21:30 Anaerobic Culture - Preliminary





 Other - Other